# Patient Record
Sex: FEMALE | Race: ASIAN | NOT HISPANIC OR LATINO | URBAN - METROPOLITAN AREA
[De-identification: names, ages, dates, MRNs, and addresses within clinical notes are randomized per-mention and may not be internally consistent; named-entity substitution may affect disease eponyms.]

---

## 2018-04-12 ENCOUNTER — RECORDS - HEALTHEAST (OUTPATIENT)
Dept: LAB | Facility: CLINIC | Age: 33
End: 2018-04-12

## 2018-04-12 LAB
BASOPHILS # BLD AUTO: 0 THOU/UL (ref 0–0.2)
BASOPHILS NFR BLD AUTO: 0 % (ref 0–2)
EOSINOPHIL # BLD AUTO: 0.8 THOU/UL (ref 0–0.4)
EOSINOPHIL NFR BLD AUTO: 8 % (ref 0–6)
ERYTHROCYTE [DISTWIDTH] IN BLOOD BY AUTOMATED COUNT: 15.6 % (ref 11–14.5)
HCT VFR BLD AUTO: 33.8 % (ref 35–47)
HGB BLD-MCNC: 10.7 G/DL (ref 12–16)
HIV 1+2 AB+HIV1 P24 AG SERPL QL IA: NEGATIVE
LYMPHOCYTES # BLD AUTO: 2.8 THOU/UL (ref 0.8–4.4)
LYMPHOCYTES NFR BLD AUTO: 26 % (ref 20–40)
MCH RBC QN AUTO: 19.5 PG (ref 27–34)
MCHC RBC AUTO-ENTMCNC: 31.7 G/DL (ref 32–36)
MCV RBC AUTO: 62 FL (ref 80–100)
MONOCYTES # BLD AUTO: 0.5 THOU/UL (ref 0–0.9)
MONOCYTES NFR BLD AUTO: 4 % (ref 2–10)
NEUTROPHILS # BLD AUTO: 6.7 THOU/UL (ref 2–7.7)
NEUTROPHILS NFR BLD AUTO: 62 % (ref 50–70)
PLATELET # BLD AUTO: 353 THOU/UL (ref 140–440)
PMV BLD AUTO: 11 FL (ref 8.5–12.5)
RBC # BLD AUTO: 5.5 MILL/UL (ref 3.8–5.4)
RUBV IGG SERPL QL IA: POSITIVE
T PALLIDUM AB SER QL: NEGATIVE
WBC: 10.9 THOU/UL (ref 4–11)

## 2018-04-13 LAB
ABO/RH(D): NORMAL
ABORH REPEAT: NORMAL
ANTIBODY SCREEN: NEGATIVE
BACTERIA SPEC CULT: NO GROWTH
HBV SURFACE AG SERPL QL IA: NEGATIVE

## 2018-04-16 LAB
C TRACH DNA SPEC QL PROBE+SIG AMP: POSITIVE
N GONORRHOEA DNA SPEC QL NAA+PROBE: NEGATIVE

## 2018-05-09 ENCOUNTER — RECORDS - HEALTHEAST (OUTPATIENT)
Dept: LAB | Facility: CLINIC | Age: 33
End: 2018-05-09

## 2018-05-10 LAB
C TRACH DNA SPEC QL PROBE+SIG AMP: NEGATIVE
N GONORRHOEA DNA SPEC QL NAA+PROBE: NEGATIVE

## 2018-06-06 ENCOUNTER — RECORDS - HEALTHEAST (OUTPATIENT)
Dept: LAB | Facility: CLINIC | Age: 33
End: 2018-06-06

## 2018-06-06 LAB
BASOPHILS # BLD AUTO: 0.1 THOU/UL (ref 0–0.2)
BASOPHILS NFR BLD AUTO: 0 % (ref 0–2)
EOSINOPHIL # BLD AUTO: 0.6 THOU/UL (ref 0–0.4)
EOSINOPHIL NFR BLD AUTO: 6 % (ref 0–6)
ERYTHROCYTE [DISTWIDTH] IN BLOOD BY AUTOMATED COUNT: 17.1 % (ref 11–14.5)
HCT VFR BLD AUTO: 27.8 % (ref 35–47)
HGB BLD-MCNC: 9 G/DL (ref 12–16)
LYMPHOCYTES # BLD AUTO: 2.6 THOU/UL (ref 0.8–4.4)
LYMPHOCYTES NFR BLD AUTO: 23 % (ref 20–40)
MCH RBC QN AUTO: 19.8 PG (ref 27–34)
MCHC RBC AUTO-ENTMCNC: 32.4 G/DL (ref 32–36)
MCV RBC AUTO: 61 FL (ref 80–100)
MONOCYTES # BLD AUTO: 0.5 THOU/UL (ref 0–0.9)
MONOCYTES NFR BLD AUTO: 5 % (ref 2–10)
NEUTROPHILS # BLD AUTO: 7.3 THOU/UL (ref 2–7.7)
NEUTROPHILS NFR BLD AUTO: 66 % (ref 50–70)
PLATELET # BLD AUTO: 324 THOU/UL (ref 140–440)
PMV BLD AUTO: 10.3 FL (ref 8.5–12.5)
RBC # BLD AUTO: 4.54 MILL/UL (ref 3.8–5.4)
WBC: 11.1 THOU/UL (ref 4–11)

## 2018-06-07 LAB — BACTERIA SPEC CULT: NO GROWTH

## 2018-07-05 ENCOUNTER — RECORDS - HEALTHEAST (OUTPATIENT)
Dept: LAB | Facility: CLINIC | Age: 33
End: 2018-07-05

## 2018-07-05 LAB
BASOPHILS # BLD AUTO: 0.1 THOU/UL (ref 0–0.2)
BASOPHILS NFR BLD AUTO: 0 % (ref 0–2)
EOSINOPHIL # BLD AUTO: 0.5 THOU/UL (ref 0–0.4)
EOSINOPHIL NFR BLD AUTO: 5 % (ref 0–6)
ERYTHROCYTE [DISTWIDTH] IN BLOOD BY AUTOMATED COUNT: 16.6 % (ref 11–14.5)
HCT VFR BLD AUTO: 26.7 % (ref 35–47)
HGB BLD-MCNC: 8.5 G/DL (ref 12–16)
LYMPHOCYTES # BLD AUTO: 2.7 THOU/UL (ref 0.8–4.4)
LYMPHOCYTES NFR BLD AUTO: 24 % (ref 20–40)
MCH RBC QN AUTO: 20.3 PG (ref 27–34)
MCHC RBC AUTO-ENTMCNC: 31.8 G/DL (ref 32–36)
MCV RBC AUTO: 64 FL (ref 80–100)
MONOCYTES # BLD AUTO: 0.5 THOU/UL (ref 0–0.9)
MONOCYTES NFR BLD AUTO: 4 % (ref 2–10)
NEUTROPHILS # BLD AUTO: 7.5 THOU/UL (ref 2–7.7)
NEUTROPHILS NFR BLD AUTO: 67 % (ref 50–70)
PLATELET # BLD AUTO: 347 THOU/UL (ref 140–440)
PMV BLD AUTO: 11 FL (ref 8.5–12.5)
RBC # BLD AUTO: 4.18 MILL/UL (ref 3.8–5.4)
WBC: 11.4 THOU/UL (ref 4–11)

## 2018-08-08 ENCOUNTER — RECORDS - HEALTHEAST (OUTPATIENT)
Dept: LAB | Facility: CLINIC | Age: 33
End: 2018-08-08

## 2018-08-09 LAB — T PALLIDUM AB SER QL: NEGATIVE

## 2018-10-10 ENCOUNTER — RECORDS - HEALTHEAST (OUTPATIENT)
Dept: ADMINISTRATIVE | Facility: OTHER | Age: 33
End: 2018-10-10

## 2018-10-10 ENCOUNTER — RECORDS - HEALTHEAST (OUTPATIENT)
Dept: LAB | Facility: CLINIC | Age: 33
End: 2018-10-10

## 2018-10-11 ENCOUNTER — HOSPITAL ENCOUNTER (OUTPATIENT)
Dept: OBGYN | Facility: HOSPITAL | Age: 33
Discharge: HOME | End: 2018-10-11
Attending: FAMILY MEDICINE

## 2018-10-11 ENCOUNTER — HOSPITAL ENCOUNTER (OUTPATIENT)
Dept: OBGYN | Facility: HOSPITAL | Age: 33
Discharge: HOME | End: 2018-10-11
Attending: FAMILY MEDICINE | Admitting: FAMILY MEDICINE

## 2018-10-11 ENCOUNTER — HOSPITAL ENCOUNTER (OUTPATIENT)
Dept: ULTRASOUND IMAGING | Facility: HOSPITAL | Age: 33
Discharge: HOME | End: 2018-10-11
Attending: FAMILY MEDICINE

## 2018-10-11 DIAGNOSIS — O36.5930 SMALL-FOR-DATES FETUS, THIRD TRIMESTER: ICD-10-CM

## 2018-10-11 DIAGNOSIS — Z36.89 ENCOUNTER FOR ULTRASOUND TO CHECK FETAL GROWTH: ICD-10-CM

## 2018-10-11 LAB — BACTERIA SPEC CULT: NO GROWTH

## 2018-10-11 RX ORDER — FERROUS SULFATE 325(65) MG
1 TABLET ORAL
Status: SHIPPED | COMMUNITY
Start: 2018-10-11

## 2018-10-11 RX ORDER — SWAB
1 SWAB, NON-MEDICATED MISCELLANEOUS DAILY
Status: SHIPPED | COMMUNITY
Start: 2018-10-11

## 2018-10-12 LAB
ALLERGIC TO PENICILLIN: NO
C TRACH DNA SPEC QL PROBE+SIG AMP: POSITIVE
GP B STREP DNA SPEC QL NAA+PROBE: NEGATIVE
N GONORRHOEA DNA SPEC QL NAA+PROBE: NEGATIVE

## 2018-11-02 ENCOUNTER — RECORDS - HEALTHEAST (OUTPATIENT)
Dept: ADMINISTRATIVE | Facility: OTHER | Age: 33
End: 2018-11-02

## 2018-11-07 ENCOUNTER — HOME CARE/HOSPICE - HEALTHEAST (OUTPATIENT)
Dept: HOME HEALTH SERVICES | Facility: HOME HEALTH | Age: 33
End: 2018-11-07

## 2018-11-08 ENCOUNTER — HOME CARE/HOSPICE - HEALTHEAST (OUTPATIENT)
Dept: HOME HEALTH SERVICES | Facility: HOME HEALTH | Age: 33
End: 2018-11-08

## 2018-12-19 ENCOUNTER — RECORDS - HEALTHEAST (OUTPATIENT)
Dept: LAB | Facility: CLINIC | Age: 33
End: 2018-12-19

## 2018-12-20 LAB
C TRACH DNA SPEC QL PROBE+SIG AMP: NEGATIVE
N GONORRHOEA DNA SPEC QL NAA+PROBE: NEGATIVE

## 2020-02-18 ENCOUNTER — RECORDS - HEALTHEAST (OUTPATIENT)
Dept: LAB | Facility: CLINIC | Age: 35
End: 2020-02-18

## 2020-02-19 LAB
C TRACH DNA SPEC QL PROBE+SIG AMP: NEGATIVE
N GONORRHOEA DNA SPEC QL NAA+PROBE: NEGATIVE

## 2020-02-20 LAB — BACTERIA SPEC CULT: ABNORMAL

## 2020-08-26 ENCOUNTER — PATIENT OUTREACH (OUTPATIENT)
Dept: CARE COORDINATION | Facility: CLINIC | Age: 35
End: 2020-08-26

## 2020-08-26 DIAGNOSIS — Z65.9 PSYCHOSOCIAL PROBLEM: Primary | ICD-10-CM

## 2020-08-26 SDOH — ECONOMIC STABILITY: INCOME INSECURITY: HOW HARD IS IT FOR YOU TO PAY FOR THE VERY BASICS LIKE FOOD, HOUSING, MEDICAL CARE, AND HEATING?: HARD

## 2020-08-26 SDOH — HEALTH STABILITY: MENTAL HEALTH
STRESS IS WHEN SOMEONE FEELS TENSE, NERVOUS, ANXIOUS, OR CAN'T SLEEP AT NIGHT BECAUSE THEIR MIND IS TROUBLED. HOW STRESSED ARE YOU?: RATHER MUCH

## 2020-08-26 SDOH — SOCIAL STABILITY: SOCIAL INSECURITY: WITHIN THE LAST YEAR, HAVE YOU BEEN HUMILIATED OR EMOTIONALLY ABUSED IN OTHER WAYS BY YOUR PARTNER OR EX-PARTNER?: YES

## 2020-08-26 SDOH — SOCIAL STABILITY: SOCIAL NETWORK: HOW OFTEN DO YOU ATTEND CHURCH OR RELIGIOUS SERVICES?: NEVER

## 2020-08-26 SDOH — SOCIAL STABILITY: SOCIAL NETWORK: HOW OFTEN DO YOU ATTENT MEETINGS OF THE CLUB OR ORGANIZATION YOU BELONG TO?: NEVER

## 2020-08-26 SDOH — SOCIAL STABILITY: SOCIAL NETWORK
DO YOU BELONG TO ANY CLUBS OR ORGANIZATIONS SUCH AS CHURCH GROUPS UNIONS, FRATERNAL OR ATHLETIC GROUPS, OR SCHOOL GROUPS?: NO

## 2020-08-26 SDOH — SOCIAL STABILITY: SOCIAL INSECURITY
WITHIN THE LAST YEAR, HAVE YOU BEEN KICKED, HIT, SLAPPED, OR OTHERWISE PHYSICALLY HURT BY YOUR PARTNER OR EX-PARTNER?: NO

## 2020-08-26 SDOH — ECONOMIC STABILITY: FOOD INSECURITY: WITHIN THE PAST 12 MONTHS, YOU WORRIED THAT YOUR FOOD WOULD RUN OUT BEFORE YOU GOT MONEY TO BUY MORE.: OFTEN TRUE

## 2020-08-26 SDOH — HEALTH STABILITY: PHYSICAL HEALTH: ON AVERAGE, HOW MANY MINUTES DO YOU ENGAGE IN EXERCISE AT THIS LEVEL?: 0 MIN

## 2020-08-26 SDOH — SOCIAL STABILITY: SOCIAL NETWORK: HOW OFTEN DO YOU GET TOGETHER WITH FRIENDS OR RELATIVES?: NEVER

## 2020-08-26 SDOH — ECONOMIC STABILITY: FOOD INSECURITY: WITHIN THE PAST 12 MONTHS, THE FOOD YOU BOUGHT JUST DIDN'T LAST AND YOU DIDN'T HAVE MONEY TO GET MORE.: OFTEN TRUE

## 2020-08-26 SDOH — HEALTH STABILITY: PHYSICAL HEALTH: ON AVERAGE, HOW MANY DAYS PER WEEK DO YOU ENGAGE IN MODERATE TO STRENUOUS EXERCISE (LIKE A BRISK WALK)?: 0 DAYS

## 2020-08-26 SDOH — HEALTH STABILITY: MENTAL HEALTH: HOW OFTEN DO YOU HAVE A DRINK CONTAINING ALCOHOL?: NEVER

## 2020-08-26 SDOH — ECONOMIC STABILITY: TRANSPORTATION INSECURITY
IN THE PAST 12 MONTHS, HAS THE LACK OF TRANSPORTATION KEPT YOU FROM MEDICAL APPOINTMENTS OR FROM GETTING MEDICATIONS?: YES

## 2020-08-26 SDOH — SOCIAL STABILITY: SOCIAL INSECURITY: WITHIN THE LAST YEAR, HAVE YOU BEEN AFRAID OF YOUR PARTNER OR EX-PARTNER?: NO

## 2020-08-26 SDOH — ECONOMIC STABILITY: TRANSPORTATION INSECURITY
IN THE PAST 12 MONTHS, HAS LACK OF TRANSPORTATION KEPT YOU FROM MEETINGS, WORK, OR FROM GETTING THINGS NEEDED FOR DAILY LIVING?: YES

## 2020-08-26 SDOH — SOCIAL STABILITY: SOCIAL NETWORK: IN A TYPICAL WEEK, HOW MANY TIMES DO YOU TALK ON THE PHONE WITH FAMILY, FRIENDS, OR NEIGHBORS?: ONCE A WEEK

## 2020-08-26 SDOH — SOCIAL STABILITY: SOCIAL INSECURITY
WITHIN THE LAST YEAR, HAVE TO BEEN RAPED OR FORCED TO HAVE ANY KIND OF SEXUAL ACTIVITY BY YOUR PARTNER OR EX-PARTNER?: NO

## 2020-08-26 SDOH — SOCIAL STABILITY: SOCIAL NETWORK: ARE YOU MARRIED, WIDOWED, DIVORCED, SEPARATED, NEVER MARRIED, OR LIVING WITH A PARTNER?: SEPARATED

## 2020-08-26 ASSESSMENT — ACTIVITIES OF DAILY LIVING (ADL): DEPENDENT_IADLS:: INDEPENDENT

## 2020-08-26 NOTE — LETTER
[facility]  August 26, 2020    Betsy LEE Ngwe  [address] [place] [address]      Dear Ma,    I am a clinic  who works with Bradley Denson MD at [facility]. I wanted to thank you for spending the time to talk with me.  Below is a description of clinic care coordination and how I can further assist you.      The goal of clinic care coordination is to help you manage your health and improve access to the health care system in the most efficient manner. The team can assist you in meeting your health care goals by providing education, coordinating services, strengthening the communication among your providers and supporting you with any resource needs.    Please feel free to contact me at [phone] with any questions or concerns. We are focused on providing you with the highest-quality healthcare experience possible and that all starts with you.     Sincerely,   SIOMARA Mckinnon  Clinic Care Coordinator  [phone]  [email or web address]
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
## 2020-08-26 NOTE — LETTER
[facility]  Complex Care Plan  About Me:    Patient Name:  Betsy Marina    YOB: 1985  Age:         35 year old   Ned MRN:    [id number] Telephone Information:  Home Phone [phone]   Mobile Not on file.       Address:  [address] [place] [address] Email address:  No e-mail address on record      Emergency Contact(s)    Name Relationship Lgl Grd Work Phone Home Phone Mobile Phone   1. WALDO PAW     [phone]           Primary language:  Yakelin     needed? Data Unavailable   [facility] [employer]:  [phone] op. 1  Other communication barriers:    Preferred Method of Communication:     Current living arrangement: I live in a private home with family  Mobility Status/ Medical Equipment: Independent    Health Maintenance  Health Maintenance Reviewed:      My Access Plan  Medical Emergency 911   Primary Clinic Line [facility] - [phone]                       Behavioral Health Crisis Line The National Suicide Prevention Lifeline at 1-204.460.3719 or 911             My Care Team Members  Patient Care Team       Relationship Specialty Notifications Start End    Bradley Denson MD PCP - General Family Practice  8/26/20     Phone: [phone] Fax: [phone]         [facility] [address] [place] [address]    Deedee Cordova LSW Lead Care Coordinator   8/26/20             My Care Plans  Self Management and Treatment Plan  Goals and (Comments)  Goals        General    1.Food Security (pt-stated)     Notes - Note created  8/26/2020  1:02 PM by Deedee Cordova LSW    Goal Statement: I will have enough food to feed myself and my family throughout the month for the next 6 months.   Date Goal set: 8/26/2020  Barriers: Language  Strengths: Motivated  Date to Achieve By: 1/26/2020  Patient expressed understanding of goal: Yes  Action steps to achieve this goal:  1. I will work with my CC SW to utilize food resources  such as food shelves along with my utilizing my WIC.   2. I will utilize community resources to ensure that I have food security for myself and my family.   3. I will call my CC SW with any questions or concerns.       2.Mental Health Management (pt-stated)     Notes - Note created  8/26/2020  1:05 PM by Deedee Cordova LSW    Goal Statement: I will establish with a therapist to help me cope and manage my depression symptoms within the next 6 months.   Date Goal set: 8/26/2020  Barriers: Language  Strengths: Motivated  Date to Achieve By: 1/26/2020  Patient expressed understanding of goal: yes  Action steps to achieve this goal:  1. I will utilize providers that my CC SW finds for therapy.   2. I will make an appointment and establish with a provider to help me manage my depression symptoms.   3. I will call my CC SW with any questions or concerns.              Advance Care Plans/Directives Type:        My Medical and Care Information  Problem List   There is no problem list on file for this patient.     Current Medications and Allergies:  See printed Medication Report.    Care Coordination Start Date: 8/26/2020   Frequency of Care Coordination: monthly   Form Last Updated: 08/26/2020

## 2020-08-27 ENCOUNTER — PATIENT OUTREACH (OUTPATIENT)
Dept: CARE COORDINATION | Facility: CLINIC | Age: 35
End: 2020-08-27

## 2020-08-27 DIAGNOSIS — Z65.9 PSYCHOSOCIAL PROBLEM: Primary | ICD-10-CM

## 2020-08-27 NOTE — PROGRESS NOTES
Clinic Care Coordination Contact    Follow Up Progress Note      Assessment: SW called [facility] to get food delivered to patient; food bags will be delivered to patient on 8/31/2020.   SW called [facility] to get Public Health Nurse set up; Public Health nurse to call patient next week to set up appointments to monitor patient's daughter's weight and to monitor patient's pregnancy.     SW called patient with  to update her to the [facility] nurse being setting up and the food that will be delivered. She let me know that she will be moving Sept 4th, so the food delivery will have to be changed to a new address. Patient is thankful for the public health nurse as she is worried for her daughter's trouble with weight gain.     Goals addressed this encounter:   Goals Addressed                 This Visit's Progress       Patient Stated      1.Food Security (pt-stated)   10%     Goal Statement: I will have enough food to feed myself and my family throughout the month for the next 6 months.   Date Goal set: 8/26/2020  Barriers: Language  Strengths: Motivated  Date to Achieve By: 1/26/2020  Patient expressed understanding of goal: Yes  Action steps to achieve this goal:  1. I will work with my CC SW to utilize food resources such as food shelves along with my utilizing my WIC.   2. I will utilize community resources to ensure that I have food security for myself and my family.   3. I will call my CC SW with any questions or concerns.         2.Mental Health Management (pt-stated)   10%     Goal Statement: I will establish with a therapist to help me cope and manage my depression symptoms within the next 6 months.   Date Goal set: 8/26/2020  Barriers: Language  Strengths: Motivated  Date to Achieve By: 1/26/2020  Patient expressed understanding of goal: yes  Action steps to achieve this goal:  1. I will utilize providers that my CC SW finds for therapy.   2. I will make an appointment and establish with  a provider to help me manage my depression symptoms.   3. I will call my CC SW with any questions or concerns.                  Intervention/Education provided during outreach: Described resources of having public health nurse and how the food delivery would work.      Outreach Frequency: monthly    Plan: CC SW to call patient after her food delivery to inquire if she would like to continue receiving the food donations and to see if patient has been contacted by the [facility] Nurse.   Care Coordinator will follow up in 1 week.     SIOMARA Mckinnon  Clinic Care Coordinator  [phone]  [email or web address]

## 2020-08-27 NOTE — PROGRESS NOTES
Clinic Care Coordination Contact  [facility]/Voicemail       Clinical Data: Care Coordinator Outreach  Outreach attempted x 1.  Left message on [facility] voicemail with call back information and requested return call.  Plan:  Care Coordinator Will wait to hear back from [facility].      SIOMARA Mckinnon  Clinic Care Coordinator  [phone]  [email or web address]

## 2020-09-02 ENCOUNTER — PATIENT OUTREACH (OUTPATIENT)
Dept: CARE COORDINATION | Facility: CLINIC | Age: 35
End: 2020-09-02

## 2020-09-02 DIAGNOSIS — Z65.9 PSYCHOSOCIAL PROBLEM: Primary | ICD-10-CM

## 2020-09-02 NOTE — PROGRESS NOTES
Clinic Care Coordination Contact    Follow Up Progress Note      Assessment:    -TERESITA BOLTON spoke to patient's [facility] nurse Carole. She was assigned to work with the family. Carole requested the dates of patient's appointment as she is calling patient and wants to remind her of the appointment and ensure she has transportation.    - Carole would like to know if PCP would be amendable to putting patient on antidepressant during pregnancy to assist with depression symptoms. Carole to do a full PHQ9 today to establish patient's level of depression.    -Carole to help patient file for child support for her 9 year old as he is not currently paying child support and patient is aware of his name and address.    -Carole to help patient find an agency to help her apply for cash assistance.    -Carole needs physician to writer a letter of pregnancy verification with physician or nurse signature for [facility] so Carole can assist patient throughout her pregnancy.        -TERESITA BOLTON spoke to Anastasiia at [employer] [facility] she directed SW to people and resources that would help this family. Anastasiia gave TERESITA BOLTON number for cultural mary jane [employer]- [phone]. Anastasiia also provided mental health resources for patient; Peg ( has Yakelin ).   [facility]/Voicemail  Clinical Data: Care Coordinator Outreach  Outreach attempted x 1.  Left message on Kavita Girard's  voicemail with call back information and requested return call.  Plan:  Care Coordinator will try to reach patient again in 3-5 business days.      Goals addressed this encounter:   Goals        Patient Stated      1.Food Security (pt-stated)      Goal Statement: I will have enough food to feed myself and my family throughout the month for the next 6 months.   Date Goal set: 8/26/2020  Barriers: Language  Strengths: Motivated  Date to Achieve By: 1/26/2020  Patient expressed understanding of goal: Yes  Action steps to achieve this goal:  1. I will  work with my CC SW to utilize food resources such as food shelves along with my utilizing my WIC.   2. I will utilize community resources to ensure that I have food security for myself and my family.   3. I will call my CC SW with any questions or concerns.     9/2/2020- [facility] Nurse set up; Carole Quinonesdelia will be assisting patient needs. Food Delivery will start Monday 8/31/2020.        2.Mental Health Management (pt-stated)      Goal Statement: I will establish with a therapist to help me cope and manage my depression symptoms within the next 6 months.   Date Goal set: 8/26/2020  Barriers: Language  Strengths: Motivated  Date to Achieve By: 1/26/2020  Patient expressed understanding of goal: yes  Action steps to achieve this goal:  1. I will utilize providers that my CC SW finds for therapy.   2. I will make an appointment and establish with a provider to help me manage my depression symptoms.   3. I will call my CC SW with any questions or concerns.     9/2/2020- CC SW got resources for patient for mental health resources; [facility]. SW to outreach. Goal 0-5%           Intervention/Education provided during outreach: Discussed interventions being utilized for assisting patient. CC SW called Yakelin price to assist patient.        Plan:    -Patient to attend appointment tomorrow for pregnancy.   -CC SW to write note to PCP regarding antidepressant.   - CC SW to call [facility] to get patient set up with Mental Health Resources   - CC SW to get in touch with Kavita Girard; cultural  to assist family.   Care Coordinator will follow up in 1 week.       SIOMARA Mckinnon  Clinic Care Coordinator  [phone]  [email or web address]

## 2020-09-03 ENCOUNTER — RECORDS - HEALTHEAST (OUTPATIENT)
Dept: LAB | Facility: CLINIC | Age: 35
End: 2020-09-03

## 2020-09-03 LAB
BASOPHILS # BLD AUTO: 0 THOU/UL (ref 0–0.2)
BASOPHILS NFR BLD AUTO: 0 % (ref 0–2)
EOSINOPHIL # BLD AUTO: 0.2 THOU/UL (ref 0–0.4)
EOSINOPHIL NFR BLD AUTO: 2 % (ref 0–6)
ERYTHROCYTE [DISTWIDTH] IN BLOOD BY AUTOMATED COUNT: 16.8 % (ref 11–14.5)
HCT VFR BLD AUTO: 30.3 % (ref 35–47)
HGB BLD-MCNC: 9.6 G/DL (ref 12–16)
HIV 1+2 AB+HIV1 P24 AG SERPL QL IA: NEGATIVE
IMM GRANULOCYTES # BLD: 0.1 THOU/UL
IMM GRANULOCYTES NFR BLD: 1 %
LYMPHOCYTES # BLD AUTO: 2.3 THOU/UL (ref 0.8–4.4)
LYMPHOCYTES NFR BLD AUTO: 29 % (ref 20–40)
MCH RBC QN AUTO: 19.7 PG (ref 27–34)
MCHC RBC AUTO-ENTMCNC: 31.7 G/DL (ref 32–36)
MCV RBC AUTO: 62 FL (ref 80–100)
MONOCYTES # BLD AUTO: 0.4 THOU/UL (ref 0–0.9)
MONOCYTES NFR BLD AUTO: 5 % (ref 2–10)
NEUTROPHILS # BLD AUTO: 5 THOU/UL (ref 2–7.7)
NEUTROPHILS NFR BLD AUTO: 63 % (ref 50–70)
PLATELET # BLD AUTO: 342 THOU/UL (ref 140–440)
PMV BLD AUTO: 10.9 FL (ref 8.5–12.5)
RBC # BLD AUTO: 4.88 MILL/UL (ref 3.8–5.4)
WBC: 7.9 THOU/UL (ref 4–11)

## 2020-09-04 LAB
ABO/RH(D): NORMAL
ABORH REPEAT: NORMAL
ANTIBODY SCREEN: NEGATIVE
BACTERIA SPEC CULT: NO GROWTH
C TRACH DNA SPEC QL PROBE+SIG AMP: NEGATIVE
HBV SURFACE AG SERPL QL IA: NEGATIVE
N GONORRHOEA DNA SPEC QL NAA+PROBE: NEGATIVE
RUBV IGG SERPL QL IA: POSITIVE
T PALLIDUM AB SER QL: NEGATIVE

## 2020-09-09 ENCOUNTER — PATIENT OUTREACH (OUTPATIENT)
Dept: CARE COORDINATION | Facility: CLINIC | Age: 35
End: 2020-09-09

## 2020-09-09 DIAGNOSIS — Z65.9 PSYCHOSOCIAL PROBLEM: Primary | ICD-10-CM

## 2020-09-09 NOTE — PROGRESS NOTES
Clinic Care Coordination Contact    Follow Up Progress Note      Assessment: HOANG reached out to patient to check in to see how she is doing. Patient stated that she was happy with the food delivery from [facility], but she has recently moved and doesn't know her address. She attempted to get one of her children to help her read her address, but they weren't able to read it either. Patient is going to call TERESITA BOLTON once her eldest son is home so he can assist. Once TERESITA BOLTON receives new address another food delivery can be scheduled.     Patient states that she does not have WIC or SNAP; TERESITA BOLTON to call Public Health Nurse to assist with this.     Goals Addressed                 This Visit's Progress       Patient Stated      1.Food Security (pt-stated)   On track     Goal Statement: I will have enough food to feed myself and my family throughout the month for the next 6 months.   Date Goal set: 8/26/2020  Barriers: Language  Strengths: Motivated  Date to Achieve By: 1/26/2020  Patient expressed understanding of goal: Yes  Action steps to achieve this goal:  1. I will work with my CC HOANG to utilize food resources such as food shelves along with my utilizing my WIC.   2. I will utilize community resources to ensure that I have food security for myself and my family.   3. I will call my CC HOANG with any questions or concerns.     9/9/2020- [facility] Nurse set up; Carole Lowe will be assisting patient needs. Food Delivery will start Monday 8/31/2020. TERESITA BOLTON to help patient for food stamps/ WIC.         2.Mental Health Management (pt-stated)   On track     Goal Statement: I will establish with a therapist to help me cope and manage my depression symptoms within the next 6 months.   Date Goal set: 8/26/2020  Barriers: Language  Strengths: Motivated  Date to Achieve By: 1/26/2020  Patient expressed understanding of goal: yes  Action steps to achieve this goal:  1. I will utilize providers that my TERESITA BOLTON  finds for therapy.   2. I will make an appointment and establish with a provider to help me manage my depression symptoms.   3. I will call my CC SW with any questions or concerns.     9/9/2020- CC SW got resources for patient for mental health resources; [facility] and [facility]. SW to outreach. Left message with cultural . Goal 0-5%           Intervention/Education provided during outreach: Discussed that CC SW to help patient get Snap/WIC.      Outreach Frequency: monthly    Plan:   - CC SW to call public health nurse, as patient stated she has not heard from her. CC SW spoke to public health nurse and she relayed information about patient, unsure if patient was confused about who public health nurse is.   - CC SW to assist patient with getting food delivered and Snap/ WIC.  - Will outreach in 1 week.    SIOMARA Mckinnon  Clinic Care Coordinator  [phone]  [email or web address]

## 2020-09-09 NOTE — PROGRESS NOTES
Clinic Care Coordination Contact  Care Team Conversations    SW called Carole Lowe to check in and see if there are any updates. TY does not have a therapist who speaks Yakelin, Carole plans to put in request to [facility] and [facility]. Carole is having the [facility] reach out to assist patient with financial assistance. Carole to call patient this week or early next week to check in to see how patient and her daughter are doing. Carole to update CC SW next week.      SIOMARA Mckinnon  Clinic Care Coordinator  [phone]  [email or web address]

## 2020-09-10 ENCOUNTER — PATIENT OUTREACH (OUTPATIENT)
Dept: CARE COORDINATION | Facility: CLINIC | Age: 35
End: 2020-09-10

## 2020-09-10 DIAGNOSIS — Z65.9 PSYCHOSOCIAL PROBLEM: Primary | ICD-10-CM

## 2020-09-10 ASSESSMENT — ACTIVITIES OF DAILY LIVING (ADL): DEPENDENT_IADLS:: INDEPENDENT

## 2020-09-10 NOTE — PROGRESS NOTES
Clinic Care Coordination Contact    Follow Up Progress Note      Assessment: Mari Lowe called to update CC SW on patient's new address. Carole was able to get address from patient. CC SW to call and have the address updated in patient's chart.   Carole updated CC SW to [facility] having openings and they have the ability to have Yakelin interpreters available. Carole outreached to them for individual therapy for patient and possibly family therapy.     Carole was able to find that patient has an Aunt that lives close and will be coming over daily to take care of patient and her family,  Patient was not able to tell Carole the Aunt's name or number.     Carole confirmed with patient that her SNAP was cut off, Carole to help patient apply for assistance.     Goals addressed this encounter:   Goals Addressed                 This Visit's Progress       Patient Stated      1.Food Security (pt-stated)   10%     Goal Statement: I will have enough food to feed myself and my family throughout the month for the next 6 months.   Date Goal set: 8/26/2020  Barriers: Language  Strengths: Motivated  Date to Achieve By: 1/26/2020  Patient expressed understanding of goal: Yes  Action steps to achieve this goal:  1. I will work with my CC SW to utilize food resources such as food shelves along with my utilizing my WIC.   2. I will utilize community resources to ensure that I have food security for myself and my family.   3. I will call my CC SW with any questions or concerns.     9/9/2020- Carole Lowe to help patient with combined application for food stamps.  10%        2.Mental Health Management (pt-stated)   10%     Goal Statement: I will establish with a therapist to help me cope and manage my depression symptoms within the next 6 months.   Date Goal set: 8/26/2020  Barriers: Language  Strengths: Motivated  Date to Achieve By: 1/26/2020  Patient expressed understanding of goal: yes  Action steps to achieve this goal:  1. I  will utilize providers that my CC SW finds for therapy.   2. I will make an appointment and establish with a provider to help me manage my depression symptoms.   3. I will call my CC SW with any questions or concerns.     9/9/2020- Carole Lowe, public health nurse has set up appointment with [facility]. She set up a diagnostic screen, Carole would also like to include family therapy.   Goal 5-10%              Outreach Frequency: monthly    Plan:   - CC HOANG to call [facility] to schedule a food delivery  - Carole to update CC SW weekly.   - CC SW will call patient with delivery date after food delivery has been scheduled.   Care Coordinator will follow up in 2 weeks.     SIOMARA Mckinnon  Clinic Care Coordinator  [phone]  [email or web address]

## 2020-09-15 ENCOUNTER — PATIENT OUTREACH (OUTPATIENT)
Dept: CARE COORDINATION | Facility: CLINIC | Age: 35
End: 2020-09-15

## 2020-09-15 NOTE — PROGRESS NOTES
Clinic Care Coordination Contact  [facility]/Voicemail       Clinical Data: Care Coordinator Outreach  Outreach attempted x 1.  Left message on patient's voicemail with call back information, on message it was relayed that patient will be receiving her food delivery from [facility].   Plan:Care Coordinator will try to reach patient again in 1 week.      SIOMARA Mckinnon  Clinic Care Coordinator  [phone]  [email or web address]

## 2020-09-21 ENCOUNTER — PATIENT OUTREACH (OUTPATIENT)
Dept: CARE COORDINATION | Facility: CLINIC | Age: 35
End: 2020-09-21

## 2020-09-21 NOTE — PROGRESS NOTES
Clinic Care Coordination Contact    Follow Up Progress Note      Assessment: Patient in clinic today for her ultrasound to check how far along she is. Patient had  in the room. CC SW introduced self as patient and SW have only corresponded on the phone.   CC SW brought up goals that we are working on and how patient is doing with food, writer asked if patient continued to need food delivered weekly. She said she does, she also needs assistance with paying her rent this month. With last conversation with Carole she was working with [employer] on this, CC SW will outreach to Carole regarding emergency assistance for rent.     Goals addressed this encounter:   Goals Addressed                 This Visit's Progress       Patient Stated      1.Food Security (pt-stated)   10%     Goal Statement: I will have enough food to feed myself and my family throughout the month for the next 6 months.   Date Goal set: 8/26/2020  Barriers: Language  Strengths: Motivated  Date to Achieve By: 1/26/2020  Patient expressed understanding of goal: Yes  Action steps to achieve this goal:  1. I will work with my CC SW to utilize food resources such as food shelves along with my utilizing my WIC.   2. I will utilize community resources to ensure that I have food security for myself and my family.   3. I will call my CC SW with any questions or concerns.     9/21/2020- Carole Lowe to help patient with combined application for food stamps. CC SW continuing to get food delivered to patient's home weekly.  10%        2.Mental Health Management (pt-stated)   10%     Goal Statement: I will establish with a therapist to help me cope and manage my depression symptoms within the next 6 months.   Date Goal set: 8/26/2020  Barriers: Language  Strengths: Motivated  Date to Achieve By: 1/26/2020  Patient expressed understanding of goal: yes  Action steps to achieve this goal:  1. I will utilize providers that my CC SW finds for  therapy.   2. I will make an appointment and establish with a provider to help me manage my depression symptoms.   3. I will call my CC SW with any questions or concerns.     9/9/2020- Carole Lowe, public health nurse has set up appointment with [facility]. She set up a diagnostic screen, Carole would also like to include family therapy.   Goal 5-10%             Intervention/Education provided during outreach: Went over goals with patient that we are working on. Discussed goals that patient is working on with Carole Lowe.      Outreach Frequency: monthly    Plan:   - CC SW to call Carole to inquire whether she is starting at [facility]  - CC SW to call [facility] to get food delivered.   Care Coordinator will follow up in 1 week.     SIOMARA Mckinnon  Clinic Care Coordinator  [phone]  [email or web address]

## 2020-09-22 ENCOUNTER — PATIENT OUTREACH (OUTPATIENT)
Dept: CARE COORDINATION | Facility: CLINIC | Age: 35
End: 2020-09-22

## 2020-09-22 NOTE — PROGRESS NOTES
Clinic Care Coordination Contact  Care Team Conversations    Mari Quinonesdelia called SW to update on a call she had with patient this morning and also to update SW regarding a call she had with PCP today as well.   Patient told Carole that when she called to sign up for food assistance she told them that she already had assistance. SW and Carole were both told by patient that she has no food assistance or any assistance coming in. Carole to discuss this with patient again so that she can get assistance.   Carole would also like patient to start receiving child support. Carole has filled out paperwork for child support. Patient just has to sign. SW to have patient come into clinic to go over documents and sign paperwork.   CC SW updated Carole that patient is need of emergency assistance for rent as she is unable to pay. Carole to call SW at [facility] to outreach to patient.   Keren Alarcon is scheduled to outreach to patient for therapy.   CC HOANG to update PCP.   Carole to call CC SW with updates when she receives them.     SIOMARA Mckinnon  Clinic Care Coordinator  [phone]  [email or web address]

## 2020-09-29 ENCOUNTER — PATIENT OUTREACH (OUTPATIENT)
Dept: CARE COORDINATION | Facility: CLINIC | Age: 35
End: 2020-09-29

## 2020-09-29 NOTE — PROGRESS NOTES
Clinic Care Coordination Contact    Follow Up Progress Note      Assessment: CC Received documentation from Carole Lowe for patient to sign to receive child support. Patient needs to sign the back page. CC at clinic will assist patient in signing documentation.   Pt states that she has applied for SNAP assistance, continues to need rent assistance.     Patient is not taking Sertraline, she took it 1 time and it made her too sleepy to be able to take care of children so she decided to stop taking it. CC SW explained that the side effects would subside after taking it for a little while, but patient is not interested in taking the medication at this time.     Patient states that the food deliveries that she receives are helping with her family's food. She would like to continue receiving this.        Goals addressed this encounter:   Goals Addressed                 This Visit's Progress       Patient Stated      1.Food Security (pt-stated)   20%     Goal Statement: I will have enough food to feed myself and my family throughout the month for the next 6 months.   Date Goal set: 8/26/2020  Barriers: Language  Strengths: Motivated  Date to Achieve By: 1/26/2020  Patient expressed understanding of goal: Yes  Action steps to achieve this goal:  1. I will work with my CC SW to utilize food resources such as food shelves along with my utilizing my WIC.   2. I will utilize community resources to ensure that I have food security for myself and my family.   3. I will call my CC SW with any questions or concerns.     9/29/2020- Carole Lowe to help patient with combined application for food stamps. CC SW continuing to get food delivered to patient's home weekly.  20%        2.Mental Health Management (pt-stated)   10%     Goal Statement: I will establish with a therapist to help me cope and manage my depression symptoms within the next 6 months.   Date Goal set: 8/26/2020  Barriers: Language  Strengths:  Motivated  Date to Achieve By: 1/26/2020  Patient expressed understanding of goal: yes  Action steps to achieve this goal:  1. I will utilize providers that my CC SW finds for therapy.   2. I will make an appointment and establish with a provider to help me manage my depression symptoms.   3. I will call my CC SW with any questions or concerns.     9/29/2020- Carole Lowe, public health nurse has set up appointment with [facility]. She set up a diagnostic screen, Carole would also like to include family therapy. Spoke to patient who confirms that she is in contact with Carole.   Goal 5-10%             Intervention/Education provided during outreach: Discussed patient's needs for signing the document sent by Carole Lowe. She will sign it when she comes in next week for her OB appointment.    Outreach Frequency: monthly    Plan:   - CC will update Anastasiia LO regarding the child support documentation.   - CC to call Carole re: child support documents and patient need for rent assistance.     Care Coordinator will follow up in 2 weeks.     SIOMARA Mckinnon  Clinic Care Coordinator  [phone]  [email or web address]

## 2020-09-30 ENCOUNTER — PATIENT OUTREACH (OUTPATIENT)
Dept: CARE COORDINATION | Facility: CLINIC | Age: 35
End: 2020-09-30

## 2020-09-30 NOTE — PROGRESS NOTES
Clinic Care Coordination Contact  Care Team Conversations    Pt's PHN nurse called to update SW and to get an update from SW.   Carole updated SW that she has not heard if [facility] Carole to call [facility] to inquire when patient can being seeing provider, if they are unable to see patient soon, she will utilize another provider clinic.     Carole would like to follow up with Rocio Guerra regarding patient's pregnancy and needs. Carole to call Dr. Guerra.     SIOMARA Mckinnon  Clinic Care Coordinator  [phone]  [email or web address]

## 2020-10-20 ENCOUNTER — PATIENT OUTREACH (OUTPATIENT)
Dept: CARE COORDINATION | Facility: CLINIC | Age: 35
End: 2020-10-20

## 2020-10-20 DIAGNOSIS — Z65.9 PSYCHOSOCIAL PROBLEM: Primary | ICD-10-CM

## 2020-10-20 NOTE — PROGRESS NOTES
Clinic Care Coordination Contact    Follow Up Progress Note      Assessment: Called pt today to check in and to see how she is doing. Patient has been working closely with Carole her public health nurse. Patient has completed and signed documentation for child support, it has been faxed back. Pt is feeling okay and eating as she now has food stamps. Her oldest son goes to the grocery store for her and with the help from Carole he is buying nutritious food.     Goals addressed this encounter:   Goals Addressed                 This Visit's Progress       Patient Stated      1.Food Security (pt-stated)   60%     Goal Statement: I will have enough food to feed myself and my family throughout the month for the next 6 months.   Date Goal set: 8/26/2020  Barriers: Language  Strengths: Motivated  Date to Achieve By: 1/26/2020  Patient expressed understanding of goal: Yes  Action steps to achieve this goal:  1. I will work with my CC SW to utilize food resources such as food shelves along with my utilizing my WIC.   2. I will utilize community resources to ensure that I have food security for myself and my family.   3. I will call my CC SW with any questions or concerns.     10/20/2020- Patient is receiving food stamps, she gets $600 for the month. Goal 60%        2.Mental Health Management (pt-stated)   20%     Goal Statement: I will establish with a therapist to help me cope and manage my depression symptoms within the next 6 months.   Date Goal set: 8/26/2020  Barriers: Language  Strengths: Motivated  Date to Achieve By: 1/26/2020  Patient expressed understanding of goal: yes  Action steps to achieve this goal:  1. I will utilize providers that my CC SW finds for therapy.   2. I will make an appointment and establish with a provider to help me manage my depression symptoms.   3. I will call my CC SW with any questions or concerns.       10/20/2020- Carole states that patient has started with [facility]. Patient does not want  to take antidepressants at this time.   Goal 20%             Intervention/Education provided during outreach: Patient is receiving assistance from the public health nurse. She feels that this is very helpful.      Outreach Frequency: monthly    Plan:   - Patient to continue working with Carole Lowe  Care Coordinator will follow up in 1 month.     SIOMARA Mckinnon  Clinic Care Coordinator  [phone]  [email or web address]

## 2020-11-02 ENCOUNTER — PATIENT OUTREACH (OUTPATIENT)
Dept: CARE COORDINATION | Facility: CLINIC | Age: 35
End: 2020-11-02

## 2020-11-02 DIAGNOSIS — Z65.9 PSYCHOSOCIAL PROBLEM: Primary | ICD-10-CM

## 2020-11-02 ASSESSMENT — ACTIVITIES OF DAILY LIVING (ADL): DEPENDENT_IADLS:: INDEPENDENT

## 2020-11-02 NOTE — PROGRESS NOTES
Clinic Care Coordination Contact    Follow Up Progress Note      Assessment: Quyen from [facility] called CC SW with patient on the phone to confirm what resources patient has applied for. CC SW update Quyen that CC SW helped patient apply for [employer] [place] and referred her to a Public Health Nurse, as well as applied for emergency assistance for patient as patient can't afford her rent.  Quyen notified writer that patient applied for MFIP, but her eldest son would not submit his documentation of school schedule, employment history or any personal documents, so her application was closed. Patient continues to have food stamps, Quyen would like patient to apply for WIC as she is pregnant and has a child under 5.   Patient has applied for emergency assistance with the [facility]. Quyen stated that the patient is applying for resources multiple times when she doesn't hear back after applying, so she may have multiple applications at the [facility]. Writer consulted with Anastasiia who will assist patient in filling out the Combined Application after her appointment tomorrow at 4pm as requested by Quyen. Patient's rent is $975, she is unable to pay this amount.   Writer informed Quyen that Carole had patient completed child support paperwork, this has been completed and turned in.    Goals addressed this encounter:   Goals Addressed                 This Visit's Progress       Patient Stated      1.Food Security (pt-stated)        Goal Statement: I will have enough food to feed myself and my family throughout the month for the next 6 months.   Date Goal set: 8/26/2020  Barriers: Language  Strengths: Motivated  Date to Achieve By: 1/26/2020  Patient expressed understanding of goal: Yes  Action steps to achieve this goal:  1. I will work with my CC SW to utilize food resources such as food shelves along with my utilizing my WIC.   2. I will utilize community resources to ensure that I have food security for myself and my  family.   3. I will call my CC SW with any questions or concerns.     11/2/2020- Patient applied for MFIP, but her son would not turn Goal 60%             Intervention/Education provided during outreach: Discussed resources application patient has applied for and will be applying for.    Outreach Frequency: monthly    Plan:   -Patient to fill out Combined Application for the [facility].  - Quyen to assist patient in application for [facility]  -Quyen to call Carole Lowe   - Patient to apply for [facility] without her oldest son on the application  - Discussion with patient to happen regarding her high rent, may need to discuss lower cost housing.      Care Coordinator will follow up in 1 month.     SIOMARA Mckinnon  Clinic Care Coordinator  [phone]  [email or web address]

## 2020-11-17 ENCOUNTER — PATIENT OUTREACH (OUTPATIENT)
Dept: CARE COORDINATION | Facility: CLINIC | Age: 35
End: 2020-11-17

## 2020-11-17 DIAGNOSIS — Z65.9 PSYCHOSOCIAL PROBLEM: Primary | ICD-10-CM

## 2020-11-17 NOTE — PROGRESS NOTES
Clinic Care Coordination Contact  Care Team Conversations  TERESITA BOLTON received a call from Quyen at [facility], she called to get clarity on what forms have been sent in for the patient to the [facility]. She said she has been working with the patient for few months now and doesn't feel that she has made any progress because each time the patient turns in paperwork to the [facility] and she hasn't heard something so she applies again. Quyen is not sure what TERESITA BOLTON is working on with the patient and what Carole Lowe, the public health nurse is working on.   TERESITA BOLTON relayed that since last outreach with Quyen negrete has not called the patient. Quyen is going to take the lead on ensuring patient's child support paperwork is turned in completely and will assist in the process. Quyen also will assist in the [facility].   In discussion with Quyen negrete asked if patient was able to pay her rent this month; she was. TERESITA BOLTON asked how she got money. Quyen said the patient stated it was from a friend. Patient is working with an  and plans to go back to work once her baby is born.   Quyen is going to have a conversation with [facility] nurse, so that patient is not confused by who is doing what. Patient is very confused to what she has applied for and what documents she is waiting to receive.     Clinic Care Coordination Contact    TERESITA BOLTON Follow Up Progress Note      Assessment: TERESITA BOLTON called patient to follow up after the call with Quyen. Writer asked how patient was feeling, she is feeling good. She expressed that she was tired and that she wanted a note from her doctor stating that she is unable to work due to being pregnant and she is tired. Patient's mood is better as she is now not worried about paying rent. Patient expressed that she will also have December's rent. When writer asked where the patient got the money for her November rent, the patient stated that she got it  from her , she isn't sure where the  is from. She gave writer her case number. Patient stated that she received a letter from child support and gave it to Carole.       Goals addressed this encounter:   Goals Addressed                 This Visit's Progress       Patient Stated      1.Food Security (pt-stated)   50%     Goal Statement: I will have enough food to feed myself and my family throughout the month for the next 6 months.   Date Goal set: 8/26/2020  Barriers: Language  Strengths: Motivated  Date to Achieve By: 1/26/2020  Patient expressed understanding of goal: Yes  Action steps to achieve this goal:  1. I will work with my CC SW to utilize food resources such as food shelves along with my utilizing my WIC.   2. I will utilize community resources to ensure that I have food security for myself and my family.   3. I will call my CC SW with any questions or concerns.     11/17/2020- Patient applied for MFIP, SNAP, and Child Support; waiting to hear back. Patient keeps applying which holds up the paperwork at the [facility]. Goal 50%        2.Mental Health Management (pt-stated)   20%     Goal Statement: I will establish with a therapist to help me cope and manage my depression symptoms within the next 6 months.   Date Goal set: 8/26/2020  Barriers: Language  Strengths: Motivated  Date to Achieve By: 1/26/2020  Patient expressed understanding of goal: yes  Action steps to achieve this goal:  1. I will utilize providers that my CC SW finds for therapy.   2. I will make an appointment and establish with a provider to help me manage my depression symptoms.   3. I will call my CC SW with any questions or concerns.       11/17/2020- Patient is working with someone from [facility].   Goal 20%             Intervention/Education provided during outreach: Made a plan for patient re: who is helping with what.  Quyen- child support, MFIP  Carole- health related questions  CC SW- clinic related questions  and resource support.     Patient expressed understanding and is thankful for the clarification as she was confused.   Outreach Frequency: monthly    Plan:   - Quyen to clarify her role with patient and be in frequent communication with CC SW to better assist patient.    Care Coordinator will follow up in 1 month.     SIOMARA Mckinnon  Clinic Care Coordinator  [phone]  [email or web address]

## 2020-11-30 ENCOUNTER — PATIENT OUTREACH (OUTPATIENT)
Dept: CARE COORDINATION | Facility: CLINIC | Age: 35
End: 2020-11-30

## 2020-11-30 DIAGNOSIS — Z65.9 PSYCHOSOCIAL PROBLEM: Primary | ICD-10-CM

## 2020-11-30 ASSESSMENT — ACTIVITIES OF DAILY LIVING (ADL): DEPENDENT_IADLS:: INDEPENDENT

## 2020-11-30 NOTE — PROGRESS NOTES
Clinic Care Coordination Contact  Care Team Conversations    Quyen from [facility] called to check in and update CC SW that patient now has Cash benefits and Snap benefits. Patient needs to continue working with team on affordable housing.     Clinic Care Coordination Contact  [facility]/Voicemail    Referral Source: Self-patient/Caregiver  Clinical Data: Care Coordinator Outreach  Outreach attempted x 1.  Left message on Quyen's  voicemail with call back information and requested return call.  Plan: Care Coordinator will try to reach patient again in 1 month.      SIOMARA Mckinnon  Clinic Care Coordinator  [phone]  [email or web address]

## 2020-12-03 ENCOUNTER — PATIENT OUTREACH (OUTPATIENT)
Dept: CARE COORDINATION | Facility: CLINIC | Age: 35
End: 2020-12-03

## 2020-12-03 DIAGNOSIS — Z65.9 PSYCHOSOCIAL PROBLEM: Primary | ICD-10-CM

## 2020-12-03 NOTE — PROGRESS NOTES
Clinic Care Coordination Contact  Care Team Conversations  Carole Lowe called this morning to get an update on the patient and her family. Carole updated writer that she has completed the paperwork for child support and they are just waiting now.   CC SW discussed with Carole that the patient is getting confused to who is helping with what. TERESITA BOLTON clarified roles with the patient and reiterated this with Carole. Relayed roles to patient; Quyen is  helping patient's with county paperwork (patient now receiving MFIP and SNAP. ) Carole's role is for medical education and ensuring patient SDOH are met, Carole agreed with this role clarification to patient.   Carole is concerned for patient's safety with child support paperwork being sent, she is worried about patient's exes becoming potentially angry with receiving letters for child support. Carole is going to talk to Quyen about long term housing options and safety concerns.     SIOMARA Mckinnon  Clinic Care Coordinator  [phone]  [email or web address]

## 2020-12-08 ENCOUNTER — RECORDS - HEALTHEAST (OUTPATIENT)
Dept: LAB | Facility: CLINIC | Age: 35
End: 2020-12-08

## 2020-12-10 LAB — T PALLIDUM AB SER QL: NEGATIVE

## 2021-01-11 ENCOUNTER — PATIENT OUTREACH (OUTPATIENT)
Dept: CARE COORDINATION | Facility: CLINIC | Age: 36
End: 2021-01-11

## 2021-01-11 DIAGNOSIS — Z65.9 PSYCHOSOCIAL PROBLEM: Primary | ICD-10-CM

## 2021-01-11 NOTE — PROGRESS NOTES
Clinic Care Coordination Contact    Follow Up Progress Note      Assessment: CC SW called patient to check in, she is doing well. Her pregnancy is going well, she has SNAP benefits and is not without food. She continues to work with Quyen at [facility] on child support and other [facility] documents as well as Carole Lowe ( Public Health nurse) for her pregnancy needs.     As per the patient she is receiving rent assistance, MFIP, and SNAP.     Intervention/Education provided during outreach: Patient asked CC SW if her baby's father had to be in the delivery room at the time of birth. CC SW asked the patient if she wanted him to be there, she doesn't not. CCSW ensured her that she doesn't have to have anyone in the delivery room that she doesn't want. She will not be putting his name on the birth certificate, she asked how this worked with child support. CC SW directed her to the  resources that were provided to her by the CCSW.    Outreach Frequency: monthly    Plan:   - Patient is well established with supports between Carole and Quyen.   - CC SW will outreach in 1 month to ensure patient is still doing well.   Care Coordinator will follow up in 1 month    Goals addressed this encounter:   Goals Addressed                 This Visit's Progress       Patient Stated      1.Food Security (pt-stated)   70%     Goal Statement: I will have enough food to feed myself and my family throughout the month for the next 6 months.   Date Goal set: 8/26/2020  Barriers: Language  Strengths: Motivated  Date to Achieve By: 1/26/2020  Patient expressed understanding of goal: Yes  Action steps to achieve this goal:  1. I will work with my CC SW to utilize food resources such as food shelves along with my utilizing my WIC.   2. I will utilize community resources to ensure that I have food security for myself and my family.   3. I will call my CC SW with any questions or concerns.     1/11/2021- Patient is receiving Food  Snaps, rental assistance, and the county is still going over her child support case.  Patient has enough food to get through the month. Goal 50%        2.Mental Health Management (pt-stated)   20%     Goal Statement: I will establish with a therapist to help me cope and manage my depression symptoms within the next 6 months.   Date Goal set: 8/26/2020  Barriers: Language  Strengths: Motivated  Date to Achieve By: 1/26/2020  Patient expressed understanding of goal: yes  Action steps to achieve this goal:  1. I will utilize providers that my CC SW finds for therapy.   2. I will make an appointment and establish with a provider to help me manage my depression symptoms.   3. I will call my CC SW with any questions or concerns.       1/11/2021- Patient is working with someone from [facility].   Goal 20%            SIOMARA Mckinnon  Clinic Care Coordinator  [phone]  [email or web address]

## 2021-01-27 ENCOUNTER — PATIENT OUTREACH (OUTPATIENT)
Dept: CARE COORDINATION | Facility: CLINIC | Age: 36
End: 2021-01-27

## 2021-01-27 DIAGNOSIS — Z65.9 PSYCHOSOCIAL PROBLEM: Primary | ICD-10-CM

## 2021-01-27 NOTE — PROGRESS NOTES
Clinic Care Coordination Contact    CCSW Follow Up Progress Note      Assessment: Patient's public health nurse called and left a VM to update CCSW on patient's status and to also inquire when her next appointment is so that she can ensure that the patient's son will be able to drive her. Carole mentioned on VM that she noted that the patient's HGB has decreased to 9.5, Carole has been ensuring that the patient has been taking her iron and iron enriched foods.    Intervention/Education provided during outreach: CC SW called and left VM for Carole starting that the patient's next appointment is  2/8/2021 at 4:15pm with patient's provider.   Outreach Frequency: monthly    Plan:   -Patient to have an ultrasound on 2/1/2021  -Patient to see OB 2/8/2021 at 4:15pm  -Carole to have an appointment with the patient today via video chat with  to check in.     Care Coordinator will follow up in 1 month    Goals addressed this encounter:   Goals Addressed                 This Visit's Progress       Patient Stated      2.Mental Health Management (pt-stated)   On Hold     Goal Statement: I will establish with a therapist to help me cope and manage my depression symptoms within the next 6 months.   Date Goal set: 8/26/2020  Barriers: Language  Strengths: Motivated  Date to Achieve By: 1/26/2020  Patient expressed understanding of goal: yes  Action steps to achieve this goal:  1. I will utilize providers that my CC SW finds for therapy.   2. I will make an appointment and establish with a provider to help me manage my depression symptoms.   3. I will call my CC SW with any questions or concerns.       1/11/2021- Patient is working with someone from [facility].   Goal 20%               SIOMARA Mckinnon  Clinic Care Coordinator  [phone]  [email or web address]

## 2021-02-01 ENCOUNTER — RECORDS - HEALTHEAST (OUTPATIENT)
Dept: LAB | Facility: CLINIC | Age: 36
End: 2021-02-01

## 2021-02-01 LAB
IRON SATN MFR SERPL: 27 % (ref 20–50)
IRON SERPL-MCNC: 104 UG/DL (ref 42–175)
RETICS # AUTO: 0.12 MILL/UL (ref 0.01–0.11)
RETICS/RBC NFR AUTO: 2.46 % (ref 0.8–2.7)
TIBC SERPL-MCNC: 389 UG/DL (ref 313–563)
TRANSFERRIN SERPL-MCNC: 311 MG/DL (ref 212–360)

## 2021-02-02 LAB
BASO STIPL BLD QL SMEAR: ABNORMAL
BASOPHILS # BLD AUTO: 0.1 THOU/UL (ref 0–0.2)
BASOPHILS NFR BLD AUTO: 1 % (ref 0–2)
EOSINOPHIL # BLD AUTO: 0.1 THOU/UL (ref 0–0.4)
EOSINOPHIL NFR BLD AUTO: 1 % (ref 0–6)
ERYTHROCYTE [DISTWIDTH] IN BLOOD BY AUTOMATED COUNT: 15.1 % (ref 11–14.5)
HCT VFR BLD AUTO: 30.4 % (ref 35–47)
HGB BLD-MCNC: 9.6 G/DL (ref 12–16)
IMM GRANULOCYTES # BLD: 0.2 THOU/UL
IMM GRANULOCYTES NFR BLD: 2 %
LAB AP CHARGES (HE HISTORICAL CONVERSION): NORMAL
LYMPHOCYTES # BLD AUTO: 2.1 THOU/UL (ref 0.8–4.4)
LYMPHOCYTES NFR BLD AUTO: 22 % (ref 20–40)
MCH RBC QN AUTO: 19.9 PG (ref 27–34)
MCHC RBC AUTO-ENTMCNC: 31.6 G/DL (ref 32–36)
MCV RBC AUTO: 63 FL (ref 80–100)
MONOCYTES # BLD AUTO: 0.5 THOU/UL (ref 0–0.9)
MONOCYTES NFR BLD AUTO: 5 % (ref 2–10)
NEUTROPHILS # BLD AUTO: 6.8 THOU/UL (ref 2–7.7)
NEUTROPHILS NFR BLD AUTO: 70 % (ref 50–70)
OVALOCYTES: ABNORMAL
PATH REPORT.COMMENTS IMP SPEC: NORMAL
PATH REPORT.COMMENTS IMP SPEC: NORMAL
PATH REPORT.FINAL DX SPEC: NORMAL
PATH REPORT.MICROSCOPIC SPEC OTHER STN: ABNORMAL
PATH REPORT.MICROSCOPIC SPEC OTHER STN: NORMAL
PATH REPORT.RELEVANT HX SPEC: NORMAL
PLAT MORPH BLD: NORMAL
PLATELET # BLD AUTO: 351 THOU/UL (ref 140–440)
PMV BLD AUTO: 10.6 FL (ref 8.5–12.5)
POLYCHROMASIA BLD QL SMEAR: ABNORMAL
RBC # BLD AUTO: 4.83 MILL/UL (ref 3.8–5.4)
WBC: 9.7 THOU/UL (ref 4–11)

## 2021-02-04 LAB
HEMOGLOBIN A2 QUANTITATION: 4.8 % (ref 2.2–3.5)
HEMOGLOBIN ELECTROPHRESIS: ABNORMAL
HEMOGLOBIN F QUANTITATION: <0.8 % (ref 0–2)
PATH ICD:: ABNORMAL
REVIEWING PATHOLOGIST: ABNORMAL

## 2021-02-22 ENCOUNTER — RECORDS - HEALTHEAST (OUTPATIENT)
Dept: LAB | Facility: CLINIC | Age: 36
End: 2021-02-22

## 2021-02-22 ENCOUNTER — PATIENT OUTREACH (OUTPATIENT)
Dept: CARE COORDINATION | Facility: CLINIC | Age: 36
End: 2021-02-22

## 2021-02-22 DIAGNOSIS — Z65.9 PSYCHOSOCIAL PROBLEM: Primary | ICD-10-CM

## 2021-02-22 NOTE — PROGRESS NOTES
Clinic Care Coordination Contact    Follow Up Progress Note      Assessment: TERESITA BOLTON called to check in with patient. She is doing well, she is able to pay her rent each month and she receives food stamps now which ensures that her and her family have food.   Intervention/Education provided during outreach: She endorsed some abdominal pain, CC HOANG encouraged her to discuss this with her provider at her appointment this afternoon. Patient continues to work with PHN.        Outreach Frequency: 2 months    Plan:   - CC SW will call patient in 2 months.         Goals addressed this encounter:   Goals Addressed                 This Visit's Progress       Patient Stated      COMPLETED: 1.Food Security (pt-stated)        Goal Statement: I will have enough food to feed myself and my family throughout the month for the next 6 months.   Date Goal set: 8/26/2020  Barriers: Language  Strengths: Motivated  Date to Achieve By: 1/26/2020  Patient expressed understanding of goal: Yes  Action steps to achieve this goal:  1. I will work with my CC SW to utilize food resources such as food shelves along with my utilizing my WIC.   2. I will utilize community resources to ensure that I have food security for myself and my family.   3. I will call my CC SW with any questions or concerns.     2/22/2021- Patient is receiving Food Snaps, rental assistance, and the county is still going over her child support case.  Patient has enough food to get through the month. Goal 100%        2.Mental Health Management (pt-stated)   30%     Goal Statement: I will establish with a therapist to help me cope and manage my depression symptoms within the next 6 months.   Date Goal set: 8/26/2020  Barriers: Language  Strengths: Motivated  Date to Achieve By: 1/26/2020  Patient expressed understanding of goal: yes  Action steps to achieve this goal:  1. I will utilize providers that my CC SW finds for therapy.   2. I will make an appointment and establish with  a provider to help me manage my depression symptoms.   3. I will call my CC SW with any questions or concerns.       1/11/2021- Patient is working with someone from [facility].   Goal 30%            SIOMARA Mckinnon  Clinic Care Coordinator  [phone]  [email or web address]

## 2021-02-24 LAB
ALLERGIC TO PENICILLIN: NORMAL
GP B STREP DNA SPEC QL NAA+PROBE: NEGATIVE

## 2021-03-16 ENCOUNTER — PATIENT OUTREACH (OUTPATIENT)
Dept: CARE COORDINATION | Facility: CLINIC | Age: 36
End: 2021-03-16

## 2021-03-16 DIAGNOSIS — Z65.9 PSYCHOSOCIAL PROBLEM: Primary | ICD-10-CM

## 2021-03-16 ASSESSMENT — ACTIVITIES OF DAILY LIVING (ADL): DEPENDENT_IADLS:: INDEPENDENT

## 2021-03-16 NOTE — PROGRESS NOTES
Clinic Care Coordination Contact  Care Team Conversations    CC SW called Carole Quinonesdelia, patient's [facility] nurse as a return call. There was an issue with spelling on her 's birth certificate. It is all worked out now. CC SW expressed concern that patient continues to endorse feelings of sadness due to baby's father not being in their life. CCSW recommended that patient get a therapist who she would see on a weekly basis. Carole agrees with this and will talk to the patient's [facility] worker; Quyen from [employer].       SIOMARA Mckinnon  Clinic Care Coordinator  [phone]  [email or web address]

## 2021-04-19 ENCOUNTER — RECORDS - HEALTHEAST (OUTPATIENT)
Dept: LAB | Facility: CLINIC | Age: 36
End: 2021-04-19

## 2021-04-19 LAB
BASOPHILS # BLD AUTO: 0.1 THOU/UL (ref 0–0.2)
BASOPHILS NFR BLD AUTO: 1 % (ref 0–2)
EOSINOPHIL # BLD AUTO: 0.2 THOU/UL (ref 0–0.4)
EOSINOPHIL NFR BLD AUTO: 2 % (ref 0–6)
ERYTHROCYTE [DISTWIDTH] IN BLOOD BY AUTOMATED COUNT: 15.9 % (ref 11–14.5)
HCT VFR BLD AUTO: 33.5 % (ref 35–47)
HGB BLD-MCNC: 10 G/DL (ref 12–16)
IMM GRANULOCYTES # BLD: 0 THOU/UL
IMM GRANULOCYTES NFR BLD: 0 %
IRON SERPL-MCNC: 35 UG/DL (ref 42–175)
LYMPHOCYTES # BLD AUTO: 2.8 THOU/UL (ref 0.8–4.4)
LYMPHOCYTES NFR BLD AUTO: 31 % (ref 20–40)
MCH RBC QN AUTO: 18.6 PG (ref 27–34)
MCHC RBC AUTO-ENTMCNC: 29.9 G/DL (ref 32–36)
MCV RBC AUTO: 62 FL (ref 80–100)
MONOCYTES # BLD AUTO: 0.4 THOU/UL (ref 0–0.9)
MONOCYTES NFR BLD AUTO: 5 % (ref 2–10)
NEUTROPHILS # BLD AUTO: 5.7 THOU/UL (ref 2–7.7)
NEUTROPHILS NFR BLD AUTO: 61 % (ref 50–70)
PLATELET # BLD AUTO: 420 THOU/UL (ref 140–440)
PMV BLD AUTO: 10.9 FL (ref 8.5–12.5)
RBC # BLD AUTO: 5.38 MILL/UL (ref 3.8–5.4)
WBC: 9.2 THOU/UL (ref 4–11)

## 2021-04-20 LAB
HPV SOURCE: NORMAL
HUMAN PAPILLOMA VIRUS 16 DNA: NEGATIVE
HUMAN PAPILLOMA VIRUS 18 DNA: NEGATIVE
HUMAN PAPILLOMA VIRUS FINAL DIAGNOSIS: NORMAL
HUMAN PAPILLOMA VIRUS OTHER HR: NEGATIVE
SPECIMEN DESCRIPTION: NORMAL

## 2021-04-26 LAB
BKR LAB AP ABNORMAL BLEEDING: NO
BKR LAB AP BIRTH CONTROL/HORMONES: NORMAL
BKR LAB AP CERVICAL APPEARANCE: NORMAL
BKR LAB AP GYN ADEQUACY: NORMAL
BKR LAB AP GYN INTERPRETATION: NORMAL
BKR LAB AP HPV REFLEX: NORMAL
BKR LAB AP LMP: NORMAL
BKR LAB AP PATIENT STATUS: NORMAL
BKR LAB AP PREVIOUS ABNORMAL: NORMAL
BKR LAB AP PREVIOUS NORMAL: 2018
HIGH RISK?: NO
PATH REPORT.COMMENTS IMP SPEC: NORMAL
RESULT FLAG (HE HISTORICAL CONVERSION): NORMAL

## 2021-04-28 ENCOUNTER — PATIENT OUTREACH (OUTPATIENT)
Dept: CARE COORDINATION | Facility: CLINIC | Age: 36
End: 2021-04-28

## 2021-04-28 DIAGNOSIS — Z65.9 PSYCHOSOCIAL PROBLEM: Primary | ICD-10-CM

## 2021-04-28 NOTE — PROGRESS NOTES
Clinic Care Coordination Contact    CC SW Follow Up Progress Note      Assessment: CC SW called patient to check in since last outreach. Carole Medeldanis, the [facility] nurse was at patient's house for a home visit. Patient is doing well, Carole states her mood is light and happy. She smiles at the baby and feeds the baby often. Carole Lowe was at the house today educating patient on increasing formula intake.        Intervention/Education provided during outreach: Carole will continue to visit the patient in the home weekly on Wednesdays. Carole will continue to monitor weight gain.     Plan:   No additional needs from CC HOANG.   Care Coordinator will follow up in No further outreaches will be made at this time unless a new referral is made or a change in the pt's status occurs. Patient was provided with CC SW contact information and encouraged to call with any questions or concerns.      SIOMARA Mckinnon  Clinic Care Coordinator  [phone]  [email or web address]

## 2021-06-16 PROBLEM — A56.09 CHLAMYDIAL CERVICITIS: Status: ACTIVE | Noted: 2018-11-06

## 2021-06-16 PROBLEM — O99.019 ANEMIA IN PREGNANCY: Status: ACTIVE | Noted: 2018-11-06

## 2021-07-14 PROBLEM — Z34.90 PREGNANT: Status: RESOLVED | Noted: 2018-11-04 | Resolved: 2018-11-06

## 2021-07-14 PROBLEM — O36.5990 IUGR (INTRAUTERINE GROWTH RESTRICTION) AFFECTING CARE OF MOTHER: Status: RESOLVED | Noted: 2018-11-05 | Resolved: 2018-11-06

## 2021-07-19 ENCOUNTER — LAB REQUISITION (OUTPATIENT)
Dept: LAB | Facility: CLINIC | Age: 36
End: 2021-07-19
Payer: COMMERCIAL

## 2021-07-19 DIAGNOSIS — R30.0 DYSURIA: ICD-10-CM

## 2021-07-19 PROCEDURE — 87086 URINE CULTURE/COLONY COUNT: CPT | Mod: ORL | Performed by: FAMILY MEDICINE

## 2021-07-22 LAB
BACTERIA UR CULT: ABNORMAL
BACTERIA UR CULT: ABNORMAL

## 2022-08-17 ENCOUNTER — LAB REQUISITION (OUTPATIENT)
Dept: LAB | Facility: CLINIC | Age: 37
End: 2022-08-17
Payer: COMMERCIAL

## 2022-08-17 DIAGNOSIS — N30.00 ACUTE CYSTITIS WITHOUT HEMATURIA: ICD-10-CM

## 2022-08-17 PROCEDURE — 87086 URINE CULTURE/COLONY COUNT: CPT | Mod: ORL | Performed by: NURSE PRACTITIONER

## 2022-08-20 LAB — BACTERIA UR CULT: NORMAL

## 2022-10-19 ENCOUNTER — LAB REQUISITION (OUTPATIENT)
Dept: LAB | Facility: CLINIC | Age: 37
End: 2022-10-19

## 2022-10-19 DIAGNOSIS — R30.0 DYSURIA: ICD-10-CM

## 2022-10-19 PROCEDURE — 87086 URINE CULTURE/COLONY COUNT: CPT | Performed by: FAMILY MEDICINE

## 2022-10-21 LAB — BACTERIA UR CULT: ABNORMAL

## 2023-10-11 ENCOUNTER — LAB REQUISITION (OUTPATIENT)
Dept: LAB | Facility: CLINIC | Age: 38
End: 2023-10-11
Payer: COMMERCIAL

## 2023-10-11 DIAGNOSIS — Z11.59 ENCOUNTER FOR SCREENING FOR OTHER VIRAL DISEASES: ICD-10-CM

## 2023-10-11 DIAGNOSIS — Z13.220 ENCOUNTER FOR SCREENING FOR LIPOID DISORDERS: ICD-10-CM

## 2023-10-11 PROCEDURE — 80061 LIPID PANEL: CPT | Mod: ORL | Performed by: FAMILY MEDICINE

## 2023-10-11 PROCEDURE — 86803 HEPATITIS C AB TEST: CPT | Mod: ORL | Performed by: FAMILY MEDICINE

## 2023-10-12 LAB
CHOLEST SERPL-MCNC: 146 MG/DL
HCV AB SERPL QL IA: NONREACTIVE
HDLC SERPL-MCNC: 45 MG/DL
LDLC SERPL CALC-MCNC: 77 MG/DL
NONHDLC SERPL-MCNC: 101 MG/DL
TRIGL SERPL-MCNC: 121 MG/DL

## 2024-05-09 ENCOUNTER — LAB REQUISITION (OUTPATIENT)
Dept: LAB | Facility: CLINIC | Age: 39
End: 2024-05-09
Payer: COMMERCIAL

## 2024-05-09 DIAGNOSIS — D50.8 OTHER IRON DEFICIENCY ANEMIAS: ICD-10-CM

## 2024-05-09 PROCEDURE — 83550 IRON BINDING TEST: CPT | Mod: ORL | Performed by: FAMILY MEDICINE

## 2024-05-10 LAB
IRON BINDING CAPACITY (ROCHE): 238 UG/DL (ref 240–430)
IRON SATN MFR SERPL: 23 % (ref 15–46)
IRON SERPL-MCNC: 54 UG/DL (ref 37–145)

## 2024-07-08 ENCOUNTER — PATIENT OUTREACH (OUTPATIENT)
Dept: CARE COORDINATION | Facility: CLINIC | Age: 39
End: 2024-07-08
Payer: COMMERCIAL

## 2024-07-08 ASSESSMENT — ACTIVITIES OF DAILY LIVING (ADL): DEPENDENT_IADLS:: INDEPENDENT
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
## 2024-07-08 NOTE — LETTER
[facility]  Patient Centered Plan of Care  About Me:        Patient Name:  Betsy Marina    YOB: 1985  Age:         39 year old   Ned MRN:    [id number] Telephone Information:  Home Phone [phone]   Mobile Not on file.       Address:  [address]   [address]  [place] [address] Email address:  No e-mail address on record      Emergency Contact(s)    Name Relationship Lgl Grd Work Phone Home Phone Mobile Phone   1. WALDO PAW     [phone]   2. BENTLEY HARVEY Significant ot*   [phone]           Primary language:  Yakelin     needed? Data Unavailable   N[facility] Language Services:  [phone] op. 1  Other communication barriers:Language barrier    Preferred Method of Communication:  Phone  Current living arrangement: I live in a private home with family    Mobility Status/ Medical Equipment: Independent        Health Maintenance  Health Maintenance Reviewed: Not assessed      My Access Plan  Medical Emergency 911   Primary Clinic Line [facility]   24 Hour Appointment Line [phone] or  [phone]) (toll-free)   24 Hour Nurse Line [phone] (toll-free)   Preferred Urgent Care Other ([facility])     Preferred Hospital [facility]  [phone]     [id number] - [place], [place] - [address] [place] [address] AT SEC OF [facility]     Behavioral Health Crisis Line The National Suicide Prevention Lifeline at 1-426.517.9407 or Text/Call 541           My Care Team Members  Patient Care Team         Relationship Specialty Notifications Start End    Bradley Denson MD PCP - General Family Practice  8/26/20     Phone: [phone] Fax: [phone]         [address] [place] [address]    Yaneth Galvan LICSW Lead Care Coordinator  Admissions 7/8/24     Phone: [phone]                     My Care Plans  Self Management and Treatment Plan    Care Plan  Care Plan:  Financial Wellbeing       Problem: Get on MA insurance again       Goal: Call [facility] to get MA insurance back on       Start Date: 7/8/2024 Expected End Date: 9/30/2024    This Visit's Progress: 0%    Priority: High    Note:     Barriers: Language Barrier, Complex [facility] System Navigation  Strengths: Willingness to work with Care Coordinator  Patient expressed understanding of goal: Yes  Action steps to achieve this goal:  1. I will call [facility] to get my MA back on.   2. I will continue to work with [facility] for any help I need.                               Advance Care Plans/Directives:   Advanced Care Plan/Directives on file:   No    Discussed with patient/caregiver(s): No data recorded           My Medical and Care Information  Problem List   Patient Active Problem List   Diagnosis    Chlamydial cervicitis    Vaginal delivery    Anemia in pregnancy      Current Medications and Allergies:  See printed Medication Report.    Care Coordination Start Date: 7/8/2024   Frequency of Care Coordination: monthly, more frequently as needed     Form Last Updated: 07/08/2024

## 2024-07-08 NOTE — LETTER
[facility]    July 8, 2024    Betsy LEE Ngwe  [address]   [address]  [place] [address]      Dear Ma,    I am a clinic care coordinator who works with Bradley Denson MD with the [facility]. I wanted to thank you for spending the time to talk with me.  Below is a description of clinic care coordination and how I can further assist you.       The clinic care coordination team is made up of a registered nurse and care coordinator who understand the health care system. The goal of clinic care coordination is to help you manage your health and improve access to the health care system. Our team works alongside your provider to assist you in determining your health and social needs. We can help you obtain health care and community resources, providing you with necessary information and education. We can work with you through any barriers and develop a care plan that helps coordinate and strengthen the communication between you and your care team.  Our services are voluntary and are offered without charge to you personally.    Please feel free to contact me with any questions or concerns regarding care coordination and what we can offer.      We are focused on providing you with the highest-quality healthcare experience possible.    Sincerely,       ETHAN Preston, [facility]  Social Work Care Coordinator  [facility]    [facility], [facility], and [facility]    [address] [place] [address]  [email or web address]  Cell: [phone]  Gender pronouns: she/her      Enclosed: I have enclosed a copy of the Patient Centered Plan of Care. This has helpful information and goals that we have talked about. Please keep this in an easy to access place to use as needed.

## 2024-07-09 ENCOUNTER — PATIENT OUTREACH (OUTPATIENT)
Dept: CARE COORDINATION | Facility: CLINIC | Age: 39
End: 2024-07-09
Payer: COMMERCIAL

## 2024-07-09 NOTE — PROGRESS NOTES
Clinic Care Coordination Contact  [facility]/Voicemail    Clinical Data: Care Coordinator Outreach    Outreach Documentation Number of Outreach Attempt   7/9/2024   9:58 AM 1       Left message on patient's voicemail with call back information and requested return call.    Plan:  Care Coordinator will try to reach patient again in 3-5 business days.      ETHAN Preston, [facility]  Social Work Care Coordinator  [facility]    [facility], [facility], and [facility]    [address] [place] [address]  [email or web address]  Cell: [phone]  Gender pronouns: she/her

## 2024-07-16 NOTE — PROGRESS NOTES
Clinic Care Coordination Contact  Follow Up Progress Note      Assessment: HOANG LO called patient with a Yakelin . HOANG LO asked how patient was doing. She reports that she is still having issues with her breathing at night. HOANG CC asked if she had addressed that with her doctor and she has. HOANG LO will still send to triage for review. Patient would still like to call [facility] with HOANG LO. HOANG LO called [facility] with patient on the line and was on hold for quite some time before connecting with a worker. We were then transferred and the call disconnected. HOANG LO and patient made a time to talk on 7/19 at 1:00 PM.     Addendum 7/19/24: HOANG LO called [facility] and was on hold for 1 hour with no answer. HOANG LO unable to hold any longer. Will check in next month.     Care Gaps:    Health Maintenance Due   Topic Date Due    YEARLY PREVENTIVE VISIT  Never done    ADVANCE CARE PLANNING  Never done    MAMMO SCREENING  Never done    GLUCOSE  Never done    HPV IMMUNIZATION (2 - 3-dose series) 03/23/2010    HEPATITIS B IMMUNIZATION (3 of 3 - 19+ 3-dose series) 04/20/2010    COVID-19 Vaccine (3 - 2023-24 season) 09/01/2023    PHQ-2 (once per calendar year)  Never done       Currently there are no Care Gaps.    Care Plans  Care Plan: Financial Wellbeing       Problem: Get on MA insurance again       Goal: Call [facility] to get MA insurance back on       Start Date: 7/8/2024 Expected End Date: 9/30/2024    This Visit's Progress: 0% Recent Progress: 0%    Priority: High    Note:     Barriers: Language Barrier, Complex [facility] Navigation  Strengths: Willingness to work with Care Coordinator  Patient expressed understanding of goal: Yes  Action steps to achieve this goal:  1. I will call [facility] to get my MA back on.   2. I will continue to work with HOANG LO for any help I need.                               Intervention/Education provided during outreach: Patient verbalized understanding, engaged in AIDET  communication during patient encounter.       Outreach Frequency: monthly, more frequently as needed    Plan: Get MA    Care Coordinator will follow up in the next few days.       ETHAN Preston, [facility]  Social Work Care Coordinator  [facility]    [facility], [facility], and [facility]    [address] [place] [address]  [email or web address]  Cell: [phone]  Gender pronouns: she/her

## 2024-08-20 ENCOUNTER — PATIENT OUTREACH (OUTPATIENT)
Dept: CARE COORDINATION | Facility: CLINIC | Age: 39
End: 2024-08-20
Payer: COMMERCIAL

## 2024-08-20 NOTE — PROGRESS NOTES
Clinic Care Coordination Contact  [facility]/Voicemail    Clinical Data: Care Coordinator Outreach    Outreach Documentation Number of Outreach Attempt   7/9/2024   9:58 AM 1   8/20/2024  11:28 AM 1       Left message on patient's voicemail with call back information and requested return call.    Plan: Care Coordinator will try to reach patient again in 10 business days.    Kayce Mckeon, [facility]  Social Work Care Coordinator - [facility]  Care Coordination  [email or web address]  Cell Phone: [phone]  Gender pronouns: she/her  Employed by [employer]

## 2024-09-06 ENCOUNTER — PATIENT OUTREACH (OUTPATIENT)
Dept: CARE COORDINATION | Facility: CLINIC | Age: 39
End: 2024-09-06
Payer: COMMERCIAL

## 2024-09-06 NOTE — PROGRESS NOTES
Clinic Care Coordination Contact  Follow Up Progress Note      Assessment: HOANG LO outreached and spoke with the pt via an . Pt shared that she is doing well and does have her insurance reinstated. Pt stated that she would like to make an appt, and that her daughter has one in November so if she could go in then she would like that. Pt stated she doesn't have her Asthma meds currently, but would like them refilled now.     HOANG LO sent in request to the clinic for both the appt and the med refills as well.       Care Gaps:    Health Maintenance Due   Topic Date Due    YEARLY PREVENTIVE VISIT  Never done    ADVANCE CARE PLANNING  Never done    MAMMO SCREENING  Never done    GLUCOSE  Never done    HPV IMMUNIZATION (2 - 3-dose series) 03/23/2010    HEPATITIS B IMMUNIZATION (3 of 3 - 19+ 3-dose series) 04/20/2010    PHQ-2 (once per calendar year)  Never done    INFLUENZA VACCINE (1) 09/01/2024    COVID-19 Vaccine (3 - 2023-24 season) 09/01/2024       Currently there are no Care Gaps.    Care Plans  Care Plan: Financial Wellbeing       Problem: Get on MA insurance again       Goal: Call [facility] to get MA insurance back on       Start Date: 7/8/2024 Expected End Date: 9/30/2024    This Visit's Progress: 0% Recent Progress: 0%    Priority: High    Note:     Barriers: Language Barrier, Complex County System Navigation  Strengths: Willingness to work with Care Coordinator  Patient expressed understanding of goal: Yes  Action steps to achieve this goal:  1. I will call [facility] to get my MA back on.   2. I will continue to work with [employer] for any help I need.                               Intervention/Education provided during outreach: Patient verbalized understanding, engaged in AIDET communication during patient encounter.       Outreach Frequency: monthly, more frequently as needed      Plan:   Pt to follow up in clinic and to  her asthma prescription refills. Pt to contact the  CC or clinic  with any questions or concerns.   Care Coordinator will follow up in 1 month.     Kayce Mckeon [facility]  Social Work Care Coordinator - [facility]  Care Coordination  [email or web address]  Cell Phone: [phone]  Gender pronouns: she/her  Employed by [employer]

## 2024-10-09 ENCOUNTER — PATIENT OUTREACH (OUTPATIENT)
Dept: CARE COORDINATION | Facility: CLINIC | Age: 39
End: 2024-10-09
Payer: COMMERCIAL

## 2024-10-09 NOTE — LETTER
[facility]   Patient Centered Plan of Care  About Me:        Patient Name:  Betsy Marina    YOB: 1985  Age:         39 year old   Ned MRN:    [id number] Telephone Information:  Home Phone [phone]   Mobile Not on file.       Address:  [address]   [address]  [place] [address] Email address:  No e-mail address on record      Emergency Contact(s)    Name Relationship Lgl Grd Work Phone Home Phone Mobile Phone   1. WALDO PAW     [phone]   2. BENTLEY HARVEY Significant ot*   [phone]           Primary language:  Yakelin     needed? Data Unavailable   Ned Language Services:  [phone] op. 1  Other communication barriers:Language barrier    Preferred Method of Communication:     Current living arrangement: I live in a private home with family    Mobility Status/ Medical Equipment: Independent        Health Maintenance  Health Maintenance Reviewed: Not assessed      My Access Plan  Medical Emergency 911   Primary Clinic Line [facility] - [phone]   24 Hour Appointment Line [phone] or  [phone]) (toll-free)   24 Hour Nurse Line [phone] (toll-free)   Preferred Urgent Care Other ([facility])     Preferred Hospital [facility]  [phone]     Preferred Pharmacy [id number] - [place], [place] - [address] AT [address] [facility]     Behavioral Health Crisis Line The National Suicide Prevention Lifeline at 1-143.463.8581 or Text/Call 798           My Care Team Members  Patient Care Team         Relationship Specialty Notifications Start End    Bradley Denson MD PCP - General Family Practice  8/26/20     Phone: [phone] Fax: [phone]         [address] [place] [address]    Kayce Mckeon LGSW Lead Care Coordinator  Admissions 7/8/24                 My Care Plans  Self Management and Treatment Plan    Care Plan  Care Plan: Financial Wellbeing        Problem: Get on MA insurance again       Goal: Call [facility] to get MA insurance back on       Start Date: 7/8/2024 Expected End Date: 9/30/2024    This Visit's Progress: 0% Recent Progress: 0%    Priority: High    Note:     Barriers: Language Barrier, Complex [place] System Navigation  Strengths: Willingness to work with Care Coordinator  Patient expressed understanding of goal: Yes  Action steps to achieve this goal:  1. I will call [facility] to get my MA back on.   2. I will continue to work with [facility] for any help I need.                               Action Plans on File:                       Advance Care Plans/Directives:   Advanced Care Plan/Directives on file:   No    Discussed with patient/caregiver(s): No data recorded           My Medical and Care Information  Problem List   Patient Active Problem List   Diagnosis    Chlamydial cervicitis    Vaginal delivery    Anemia in pregnancy      Current Medications and Allergies:  See printed Medication Report.    Care Coordination Start Date: 7/8/2024   Frequency of Care Coordination: monthly, more frequently as needed     Form Last Updated: 10/10/2024

## 2024-10-10 NOTE — PROGRESS NOTES
Clinic Care Coordination Contact  [facility]/Voicemail    Clinical Data: Care Coordinator Outreach    Outreach Documentation Number of Outreach Attempt   10/10/2024   2:26 PM 1       Left message on patient's voicemail with call back information and requested return call.    Plan: Care Coordinator will try to reach patient again in 10 business days.    Kayce Mckeon HOANG  Social Work Care Coordinator - [facility]  Care Coordination  [email or web address]  Cell Phone: [phone]  Gender pronouns: she/her  Employed by [employer]

## 2024-10-28 ENCOUNTER — PATIENT OUTREACH (OUTPATIENT)
Dept: CARE COORDINATION | Facility: CLINIC | Age: 39
End: 2024-10-28
Payer: COMMERCIAL

## 2024-11-01 NOTE — PROGRESS NOTES
Clinic Care Coordination Contact  [facility]/Voicemail    Clinical Data: Care Coordinator Outreach    Outreach Documentation Number of Outreach Attempt   10/10/2024   2:26 PM 1   10/28/2024  10:30 AM 2       Left message on patient's voicemail with call back information and requested return call.      Plan: Care Coordinator will try to reach patient again in 10 business days.    Kayce Mckeon, [facility]  Social Work Care Coordinator - [facility]  Care Coordination  [email or web address]  Cell Phone: [phone]  Gender pronouns: she/her  Employed by [employer]

## 2024-11-05 ENCOUNTER — PATIENT OUTREACH (OUTPATIENT)
Dept: CARE COORDINATION | Facility: CLINIC | Age: 39
End: 2024-11-05
Payer: COMMERCIAL

## 2024-11-05 NOTE — PROGRESS NOTES
Clinic Care Coordination Contact  Follow Up Progress Note      Assessment: HOANG LO connected with the pt for follow up. Pt had some questions in regards to not getting her child support payments, shared a case number of [id number], as well as phone number of [phone].     HOANG LO connected with the [employer] child support dept, and they stated that wasn't a case number with them, but for [employer] assistance. Asked that the patient call them, they will call language line, and connect with and .     HOANG LO communicated back with the pt and let her know and shared the child support dept's number directly. HOANG LO updated the clinic with some questions that the pt had in regards to medication refills and sent that to the PCP's office.     Care Gaps:    Health Maintenance Due   Topic Date Due    YEARLY PREVENTIVE VISIT  Never done    ADVANCE CARE PLANNING  Never done    MAMMO SCREENING  Never done    GLUCOSE  Never done    HPV IMMUNIZATION (2 - 3-dose series) 03/23/2010    HEPATITIS B IMMUNIZATION (3 of 3 - 19+ 3-dose series) 04/20/2010    PHQ-2 (once per calendar year)  Never done    INFLUENZA VACCINE (1) 09/01/2024    COVID-19 Vaccine (3 - 2024-25 season) 09/01/2024       Currently there are no Care Gaps.    Care Plans  Care Plan: Financial Wellbeing       Problem: Get on MA insurance again       Goal: Call [facility] to get MA insurance back on       Start Date: 7/8/2024 Expected End Date: 9/30/2024    This Visit's Progress: 0% Recent Progress: 0%    Priority: High    Note:     Barriers: Language Barrier, Complex [place] System Navigation  Strengths: Willingness to work with Care Coordinator  Patient expressed understanding of goal: Yes  Action steps to achieve this goal:  1. I will call [facility] to get my MA back on.   2. I will continue to work with HOANG LO for any help I need.                               Intervention/Education provided during outreach: Patient verbalized understanding, engaged in AIDET  communication during patient encounter.       Outreach Frequency: monthly, more frequently as needed      Plan:   Pt to contact the child support dept, and will connect with the  CC with any other questions or concerns.   Care Coordinator will follow up in 1 month.     Kayce Mckeon [facility]  Social Work Care Coordinator - [facility]  Care Coordination  [email or web address]  [email or web address]  Cell Phone: [phone]  Gender pronouns: she/her  Employed by [employer]

## 2024-12-19 ENCOUNTER — PATIENT OUTREACH (OUTPATIENT)
Dept: CARE COORDINATION | Facility: CLINIC | Age: 39
End: 2024-12-19
Payer: COMMERCIAL

## 2024-12-23 ENCOUNTER — PATIENT OUTREACH (OUTPATIENT)
Dept: CARE COORDINATION | Facility: CLINIC | Age: 39
End: 2024-12-23
Payer: COMMERCIAL

## 2024-12-23 NOTE — PROGRESS NOTES
Clinic Care Coordination Contact  Follow Up Progress Note      Assessment: HOANG CC outreached and followed up with the pt for check in. Pt shared that she hasn't had rides to be able to come into clinic for follow ups. HOANG CC and pt discussed if the pt has rides through her insurance for medical appts, and the pt shared that she is unsure if she does or not.     Care Gaps:    Health Maintenance Due   Topic Date Due    ADVANCE CARE PLANNING  Never done    MAMMO SCREENING  Never done    GLUCOSE  Never done    YEARLY PREVENTIVE VISIT  Never done    HPV IMMUNIZATION (2 - 3-dose series) 03/23/2010    HEPATITIS B IMMUNIZATION (3 of 3 - 19+ 3-dose series) 04/20/2010    PHQ-2 (once per calendar year)  Never done    INFLUENZA VACCINE (1) 09/01/2024    COVID-19 Vaccine (3 - 2024-25 season) 09/01/2024       Currently there are no Care Gaps.    Care Plans  Care Plan: Financial Wellbeing       Problem: Get on MA insurance again       Goal: Call [facility] to get MA insurance back on       Start Date: 7/8/2024 Expected End Date: 9/30/2024    This Visit's Progress: 0% Recent Progress: 0%    Priority: High    Note:     Barriers: Language Barrier, Complex [place] System Navigation  Strengths: Willingness to work with Care Coordinator  Patient expressed understanding of goal: Yes  Action steps to achieve this goal:  1. I will call [facility] to get my MA back on.   2. I will continue to work with HOANG LO for any help I need.                               Intervention/Education provided during outreach: Patient verbalized understanding, engaged in AIDET communication during patient encounter.       Outreach Frequency: monthly, more frequently as needed    Plan:   HOANG LO to follow up with insurance to identify if there are medical rides for the pt.   Care Coordinator will follow up in 1 month.     Kayce Mckeon HOANG  Social Work Care Coordinator - [facility]  Coordination  [email or web address]  Cell Phone: [phone]  Gender pronouns: she/her  Employed by [employer]

## 2024-12-31 NOTE — PROGRESS NOTES
Clinic Care Coordination Contact  Care Team Conversations    She needs to reschedule an appt for Asthma Check and meds, she no showed on 12/19, and when I talked to her she said it was because she doesn't have a ride. I looked in to her insurance, and she can get medical rides, she just has to call [phone]. Can you help me in calling out to her to get rescheduled and give her the number for her to call to set up a free transportation ride to appt?  She speaks Yakelin.      HOANG CC worked with clinic staff and got pt scheduled for follow up visit.     HOANG CC will outreach for follow up in 1 month.     Kayce Mckeon, [facility]  Social Work Care Coordinator - [facility]  Care Coordination  [email or web address]  Cell Phone: [phone]  Gender pronouns: she/her  Employed by [employer]

## 2025-01-28 ENCOUNTER — PATIENT OUTREACH (OUTPATIENT)
Dept: CARE COORDINATION | Facility: CLINIC | Age: 40
End: 2025-01-28

## 2025-02-10 NOTE — PROGRESS NOTES
Clinic Care Coordination Contact  [facility]/Voicemail    Clinical Data: Care Coordinator Outreach    Outreach Documentation Number of Outreach Attempt   1/28/2025  10:05 AM 1       Left message on patient's voicemail with call back information and requested return call.      Plan:  Care Coordinator will try to reach patient again in 10 business days.    Kayce Mckeon HOANG  Social Work Care Coordinator - [facility]  Care Coordination  [email or web address]  Cell Phone: [phone]  Gender pronouns: she/her  Employed by [employer]

## 2025-02-11 ENCOUNTER — PATIENT OUTREACH (OUTPATIENT)
Dept: CARE COORDINATION | Facility: CLINIC | Age: 40
End: 2025-02-11

## 2025-02-11 NOTE — LETTER
[facility] CARE COORDINATION  [facility]     February 11, 2025    Betsy Marina  [address]  [place] [address]      Dear Ma,    I have been attempting to reach you since our last contact. I would like to continue to work with you and provide any additional support you may need on achieving your health care related goals. I would appreciate if you would give me a call at [phone] to let me know if you would like to continue working together. I know that there are many things that can affect our ability to communicate and I hope we can continue to work together.    We are focused on providing you with the highest-quality healthcare experience possible.    Sincerely,    FLAKO Senior

## 2025-03-10 NOTE — PROGRESS NOTES
Clinic Care Coordination Contact  [email or web address]Voicemail    Clinical Data: Care Coordinator Outreach    Outreach Documentation Number of Outreach Attempt   1/28/2025  10:05 AM 1   2/11/2025   2:17 PM 2       Left message on patient's voicemail with call back information and requested return call.      Plan: Care Coordinator will send unable to contact letter with care coordinator contact information via mail. Care Coordinator will try to reach patient again in 1 month.    Kayce Mckeon [facility]  Social Work Care Coordinator - [facility]  Care Coordination  [email or web address]  Cell Phone: [phone]  Gender pronouns: she/her  Employed by [employer]

## 2025-03-17 ENCOUNTER — PATIENT OUTREACH (OUTPATIENT)
Dept: CARE COORDINATION | Facility: CLINIC | Age: 40
End: 2025-03-17

## 2025-03-17 NOTE — PROGRESS NOTES
Clinic Care Coordination Contact  Follow Up Progress Note      Assessment: HOANG LO connected with the pt via  for monthly check in. Pt shared that she hasn't figured out if the  has been corrected with her insurance. She had someone helping her and was told it has been fixed multiple times but it hasn't. HOANG LO encouraged her to call her insurance agency directly to talk to them as they would have to have permission.     HOANG LO and pt discussed county benefits as well and she hasn't heard on her application submitted last month. HOANG LO let her know there is a delay of 6-8 weeks processing for paperwork. She said her kids get benefits so she is rosa maria to use them but isn't sure if she is outside the income bracket and doesn't qualify. HOANG LO encouraged her to connect with the financial workers directly but did let her know of the wait time.    HOANG LO and pt discussed some barriers she is having with insurance pieces. HOANG LO with verbal permission sent a text to the pt with the link for [place] language support to a [place]elin translated page and phone number for her.     Care Gaps:    Health Maintenance Due   Topic Date Due    ADVANCE CARE PLANNING  Never done    MAMMO SCREENING  Never done    GLUCOSE  Never done    YEARLY PREVENTIVE VISIT  Never done    HPV IMMUNIZATION (2 - 3-dose series) 2010    HEPATITIS B IMMUNIZATION (3 of 3 - 19+ 3-dose series) 2010    INFLUENZA VACCINE (1) 2024    COVID-19 Vaccine (3 - -25 season) 2024    PHQ-2 (once per calendar year)  Never done       Currently there are no Care Gaps.    Care Plans  Care Plan: Financial Wellbeing       Problem: Get on MA insurance again       Goal: Call [facility] to get MA insurance back on       Start Date: 2024 Expected End Date: 2024    This Visit's Progress: 0% Recent Progress: 0%    Priority: High    Note:     Barriers: Language Barrier, Complex [place] System Navigation  Strengths: Willingness to work with Care  Coordinator  Patient expressed understanding of goal: Yes  Action steps to achieve this goal:  1. I will call [facility] to get my MA back on.   2. I will continue to work with [facility] for any help I need.                               Intervention/Education provided during outreach: Patient verbalized understanding, engaged in AIDET communication during patient encounter.       Outreach Frequency: monthly, more frequently as needed        Plan:   Pt to follow up with case workers and with [facility] staff for help navigating insurance for herself and the kids.   Care Coordinator will follow up in 1 month.     Kayce Mckeon [facility]  Social Work Care Coordinator - [facility]  Care Coordination  [email or web address]  Cell Phone: [phone]  Gender pronouns: she/her  Employed by [employer]

## 2025-04-16 ENCOUNTER — PATIENT OUTREACH (OUTPATIENT)
Dept: CARE COORDINATION | Facility: CLINIC | Age: 40
End: 2025-04-16

## 2025-04-22 NOTE — PROGRESS NOTES
Clinic Care Coordination Contact  Follow Up Progress Note      Assessment: SW CC outreached to the pt for follow up. Pt shared that she was needing to come into clinic for a follow up appt she felt.     Per chart review the pt is scheduled for 4/24.     Care Gaps:    Health Maintenance Due   Topic Date Due    ADVANCE CARE PLANNING  Never done    MAMMO SCREENING  Never done    DIABETES SCREENING  Never done    YEARLY PREVENTIVE VISIT  Never done    HPV IMMUNIZATION (2 - 3-dose series) 03/23/2010    HEPATITIS B IMMUNIZATION (3 of 3 - 19+ 3-dose series) 04/20/2010    INFLUENZA VACCINE (1) 09/01/2024    COVID-19 Vaccine (3 - 2024-25 season) 09/01/2024    PHQ-2 (once per calendar year)  Never done       Currently there are no Care Gaps.    Care Plans  Care Plan: Financial Wellbeing       Problem: Get on MA insurance again       Goal: Call [facility] to get MA insurance back on       Start Date: 7/8/2024 Expected End Date: 9/30/2024    This Visit's Progress: 0% Recent Progress: 0%    Priority: High    Note:     Barriers: Language Barrier, Complex County System Navigation  Strengths: Willingness to work with Care Coordinator  Patient expressed understanding of goal: Yes  Action steps to achieve this goal:  1. I will call [facility] to get my MA back on.   2. I will continue to work with SW CC for any help I need.                               Intervention/Education provided during outreach: Patient verbalized understanding, engaged in AIDET communication during patient encounter.       Outreach Frequency: monthly, more frequently as needed    Plan:   Pt to follow up in the clinic and contact the SW CC with any questions or concerns.   Care Coordinator will follow up in 1 month.     Kayce Mckeon [facility]  Social Work Care Coordinator - [facility]  Care Coordination  [email or web address]  Cell Phone: [phone]  Gender pronouns: she/her  Employed by  [employer]

## 2025-05-01 ENCOUNTER — LAB REQUISITION (OUTPATIENT)
Dept: LAB | Facility: CLINIC | Age: 40
End: 2025-05-01
Payer: COMMERCIAL

## 2025-05-01 DIAGNOSIS — R30.0 DYSURIA: ICD-10-CM

## 2025-05-01 PROCEDURE — 87491 CHLMYD TRACH DNA AMP PROBE: CPT | Mod: ORL | Performed by: FAMILY MEDICINE

## 2025-05-02 LAB
C TRACH DNA SPEC QL PROBE+SIG AMP: NEGATIVE
N GONORRHOEA DNA SPEC QL NAA+PROBE: NEGATIVE
SPECIMEN TYPE: NORMAL

## 2025-05-16 ENCOUNTER — PATIENT OUTREACH (OUTPATIENT)
Dept: CARE COORDINATION | Facility: CLINIC | Age: 40
End: 2025-05-16
Payer: COMMERCIAL

## 2025-05-16 NOTE — LETTER
[facility]  Patient Centered Plan of Care  About Me:        Patient Name:  Betsy Marina    YOB: 1985  Age:         40 year old   Ned MRN:    [id number] Telephone Information:  Home Phone [phone]   Mobile [phone]       Address:  [address]  [place] [address] Email address:  No e-mail address on record      Emergency Contact(s)    Name Relationship Lgl Grd Work Phone Home Phone Mobile Phone   1. WALDO PAW     [phone]   2. WES,SRIKANTHT Significant ot*   [phone]           Primary language:  Yakelin     needed? Data Unavailable   Ned Language Services:  [phone] op. 1  Other communication barriers:Language barrier    Preferred Method of Communication:     Current living arrangement: I live in a private home with family    Mobility Status/ Medical Equipment: Independent        Health Maintenance  Health Maintenance Reviewed: Not assessed      My Access Plan  Medical Emergency 911   Primary Clinic Line [facility] [place] - [phone]   24 Hour Appointment Line [phone] or  [phone]) (toll-free)   24 Hour Nurse Line [phone] (toll-free)   Preferred Urgent Care Other ([facility])     Preferred Hospital [facility]  [phone]     Preferred Pharmacy [id number] - [place], [place] - [address] [facility]     Behavioral Health Crisis Line The National Suicide Prevention Lifeline at 1-261.102.4120 or Text/Call 788           My Care Team Members  Patient Care Team         Relationship Specialty Notifications Start End    Bradley Denson MD PCP - General Family Practice  8/26/20     Phone: [phone] Fax: [phone]         [address] [place] [address]    Kayce Mckeon LGSW Lead Care Coordinator  Admissions 7/8/24                 My Care Plans  Self Management and Treatment Plan    Care Plan  Care Plan: Financial Wellbeing        Problem: Get on MA insurance again       Goal: Call [facility] to get MA insurance back on       Start Date: 7/8/2024 Expected End Date: 9/30/2024    This Visit's Progress: 0% Recent Progress: 0%    Priority: High    Note:     Barriers: Language Barrier, Complex [place] System Navigation  Strengths: Willingness to work with Care Coordinator  Patient expressed understanding of goal: Yes  Action steps to achieve this goal:  1. I will call [facility] to get my MA back on.   2. I will continue to work with [facility] for any help I need.                               Action Plans on File:                       Advance Care Plans/Directives:   Advanced Care Plan/Directives on file: No    Discussed with patient/caregiver(s): No data recorded             My Medical and Care Information  Problem List   Patient Active Problem List   Diagnosis    Chlamydial cervicitis    Vaginal delivery    Anemia in pregnancy      Current Medications:  Please refer to the most recent medication list provided to you by your medical team and reach out to your provider with any questions or to make any corrections.    Care Coordination Start Date: 7/8/2024   Frequency of Care Coordination: monthly, more frequently as needed     Form Last Updated: 05/19/2025

## 2025-05-19 NOTE — PROGRESS NOTES
Clinic Care Coordination Contact  [facility]/Voicemail    Clinical Data: Care Coordinator Outreach    Outreach Documentation Number of Outreach Attempt   5/16/2025   9:00 AM 1       Unable to leave a message due to: phone wasn't working      Plan:  Care Coordinator will try to reach patient again in 3-5 business days.    Kayce Mckeon [facility]  Social Work Care Coordinator - [facility]  Care Coordination  [email or web address]  Cell Phone: [phone]  Gender pronouns: she/her  Employed by [employer]

## 2025-05-27 ENCOUNTER — PATIENT OUTREACH (OUTPATIENT)
Dept: CARE COORDINATION | Facility: CLINIC | Age: 40
End: 2025-05-27
Payer: COMMERCIAL

## 2025-06-02 NOTE — PROGRESS NOTES
Clinic Care Coordination Contact  Follow Up Progress Note      Assessment: HOANG LO outreached to the pt to check in, via . Pt shared that she was doing okay. Pt and HOANG CC discussed if there was a need for resources or support. Pt shared that she needs help with transportation resources. SW CC and pt discussed that she gets them through her insurance and she recognizes that but doesn't know how to call them as sometimes she has rides and sometimes she doesn't.     Care Gaps:    Health Maintenance Due   Topic Date Due    ADVANCE CARE PLANNING  Never done    MAMMO SCREENING  Never done    DIABETES SCREENING  Never done    YEARLY PREVENTIVE VISIT  Never done    HPV VACCINE (2 - 3-dose series) 03/23/2010    HEPATITIS B VACCINE (3 of 3 - 19+ 3-dose series) 04/20/2010    COVID-19 VACCINE (3 - 2024-25 season) 09/01/2024    PHQ-2 (once per calendar year)  Never done       Currently there are no Care Gaps.    Care Plans  Care Plan: Financial Wellbeing       Problem: Get on MA insurance again       Goal: Call [facility] to get MA insurance back on       Start Date: 7/8/2024 Expected End Date: 9/30/2024    This Visit's Progress: 0% Recent Progress: 0%    Priority: High    Note:     Barriers: Language Barrier, Complex County System Navigation  Strengths: Willingness to work with Care Coordinator  Patient expressed understanding of goal: Yes  Action steps to achieve this goal:  1. I will call [facility] to get my MA back on.   2. I will continue to work with HOANG LO for any help I need.                               Intervention/Education provided during outreach: Patient verbalized understanding, engaged in AIDET communication during patient encounter.       Outreach Frequency: monthly, more frequently as needed    Plan:   Pt to contact the transportation services for her insurance.  Care Coordinator will follow up in 1 month.     Kayce Mckeon [facility]  Social Work Care Coordinator - [facility]  [place]  Care Coordination  [email or web address]  Cell Phone: [phone]  Gender pronouns: she/her  Employed by [employer]

## 2025-06-30 ENCOUNTER — PATIENT OUTREACH (OUTPATIENT)
Dept: CARE COORDINATION | Facility: CLINIC | Age: 40
End: 2025-06-30
Payer: COMMERCIAL

## 2025-07-02 NOTE — PROGRESS NOTES
Clinic Care Coordination Contact  Follow Up Progress Note      Assessment: HOANG CC connected with the pt via  to check in for monthly outreach. HOANG CC and pt discussed that she was doing alright but that she was waiting on some pieces with the county as she was up for renewal by mid/early July for the pt's [place]NAP/[place] benefits. We discussed the HOANG CC sending in referral to the navigator from the [facility] to outreach as she needs some assistance.     Care Gaps:    Health Maintenance Due   Topic Date Due    ADVANCE CARE PLANNING  Never done    MAMMO SCREENING  Never done    DIABETES SCREENING  Never done    YEARLY PREVENTIVE VISIT  Never done    HPV VACCINE (2 - 3-dose series) 03/23/2010    HEPATITIS B VACCINE (3 of 3 - 19+ 3-dose series) 04/20/2010    COVID-19 VACCINE (3 - 2024-25 season) 09/01/2024    PHQ-2 (once per calendar year)  Never done       Currently there are no Care Gaps.    Care Plans  Care Plan: Financial Wellbeing       Problem: Get on MA insurance again       Goal: Call [facility] to get MA insurance back on       Start Date: 7/8/2024 Expected End Date: 9/30/2024    This Visit's Progress: 0% Recent Progress: 0%    Priority: High    Note:     Barriers: Language Barrier, Complex [place] System Navigation  Strengths: Willingness to work with Care Coordinator  Patient expressed understanding of goal: Yes  Action steps to achieve this goal:  1. I will call [facility] to get my MA back on.   2. I will continue to work with [facility] for any help I need.                               Intervention/Education provided during outreach: Patient verbalized understanding, engaged in AIDET communication during patient encounter.         Outreach Frequency: monthly, more frequently as needed    Plan:   Pt to contact the HOANG CC with any questions or concerns.   Care Coordinator will follow up in 2 weeks.     FLAKO Senior  Social Work Care Coordinator - [facility]  Coordination  [email or web address]  Cell Phone: [phone]  Gender pronouns: she/her  Employed by [employer]

## 2025-07-15 ENCOUNTER — PATIENT OUTREACH (OUTPATIENT)
Dept: CARE COORDINATION | Facility: CLINIC | Age: 40
End: 2025-07-15
Payer: COMMERCIAL

## 2025-07-28 ASSESSMENT — ACTIVITIES OF DAILY LIVING (ADL): DEPENDENT_IADLS:: INDEPENDENT

## 2025-07-28 NOTE — PROGRESS NOTES
Clinic Care Coordination Contact  Follow Up Progress Note      Assessment: SW CC outreached and connect with the pt for follow up. Pt shared that she was doing okay currently.     Care Gaps:    Health Maintenance Due   Topic Date Due    ADVANCE CARE PLANNING  Never done    MAMMO SCREENING  Never done    DIABETES SCREENING  Never done    YEARLY PREVENTIVE VISIT  Never done    HPV VACCINE (2 - 3-dose series) 03/23/2010    HEPATITIS B VACCINE (3 of 3 - 19+ 3-dose series) 04/20/2010    COVID-19 VACCINE (3 - 2024-25 season) 09/01/2024    PHQ-2 (once per calendar year)  Never done       Currently there are no Care Gaps.    Care Plans  Care Plan: Financial Wellbeing       Problem: Get on MA insurance again       Goal: Call [facility] to get MA insurance back on       Start Date: 7/8/2024 Expected End Date: 9/30/2024    This Visit's Progress: 0% Recent Progress: 0%    Priority: High    Note:     Barriers: Language Barrier, Complex [place] Navigation  Strengths: Willingness to work with Care Coordinator  Patient expressed understanding of goal: Yes  Action steps to achieve this goal:  1. I will call [facility] to get my MA back on.   2. I will continue to work with  CC for any help I need.                               Intervention/Education provided during outreach: Patient verbalized understanding, engaged in AIDET communication during patient encounter.       Outreach Frequency: monthly, more frequently as needed      Plan:   Pt to contact the HOANG CC with any questions or concerns.  Care Coordinator will follow up in 1 month.    Kayce Mckeon [facility]  Social Work Care Coordinator - [facility]  Care Coordination  [email or web address]  Cell Phone: [phone]  Gender pronouns: she/her  Employed by [employer]

## 2025-08-18 ENCOUNTER — PATIENT OUTREACH (OUTPATIENT)
Dept: CARE COORDINATION | Facility: CLINIC | Age: 40
End: 2025-08-18
Payer: COMMERCIAL